# Patient Record
Sex: FEMALE | Race: WHITE | NOT HISPANIC OR LATINO
[De-identification: names, ages, dates, MRNs, and addresses within clinical notes are randomized per-mention and may not be internally consistent; named-entity substitution may affect disease eponyms.]

---

## 2019-02-09 ENCOUNTER — TRANSCRIPTION ENCOUNTER (OUTPATIENT)
Age: 69
End: 2019-02-09

## 2019-03-25 ENCOUNTER — APPOINTMENT (OUTPATIENT)
Dept: OBGYN | Facility: CLINIC | Age: 69
End: 2019-03-25
Payer: MEDICARE

## 2019-03-25 VITALS
HEIGHT: 59 IN | SYSTOLIC BLOOD PRESSURE: 150 MMHG | BODY MASS INDEX: 34.07 KG/M2 | WEIGHT: 169 LBS | HEART RATE: 73 BPM | DIASTOLIC BLOOD PRESSURE: 77 MMHG

## 2019-03-25 DIAGNOSIS — Z80.41 FAMILY HISTORY OF MALIGNANT NEOPLASM OF OVARY: ICD-10-CM

## 2019-03-25 DIAGNOSIS — Z86.39 PERSONAL HISTORY OF OTHER ENDOCRINE, NUTRITIONAL AND METABOLIC DISEASE: ICD-10-CM

## 2019-03-25 DIAGNOSIS — N39.3 STRESS INCONTINENCE (FEMALE) (MALE): ICD-10-CM

## 2019-03-25 DIAGNOSIS — Z01.419 ENCOUNTER FOR GYNECOLOGICAL EXAMINATION (GENERAL) (ROUTINE) W/OUT ABNORMAL FINDINGS: ICD-10-CM

## 2019-03-25 DIAGNOSIS — N95.2 POSTMENOPAUSAL ATROPHIC VAGINITIS: ICD-10-CM

## 2019-03-25 PROCEDURE — 99387 INIT PM E/M NEW PAT 65+ YRS: CPT

## 2019-03-25 NOTE — PHYSICAL EXAM
[Awake] : awake [Alert] : alert [Soft] : soft [Oriented x3] : oriented to person, place, and time [Vulvar Atrophy] : vulvar atrophy [Normal] : vagina [Atrophy] : atrophy [No Bleeding] : there was no active vaginal bleeding [Absent] : absent [Adnexa Absent] : absent bilaterally [Acute Distress] : no acute distress [Mass] : no breast mass [Nipple Discharge] : no nipple discharge [Axillary LAD] : no axillary lymphadenopathy [Tender] : non tender

## 2019-03-27 ENCOUNTER — ASOB RESULT (OUTPATIENT)
Age: 69
End: 2019-03-27

## 2019-03-27 ENCOUNTER — APPOINTMENT (OUTPATIENT)
Dept: OBGYN | Facility: CLINIC | Age: 69
End: 2019-03-27
Payer: MEDICARE

## 2019-03-27 PROCEDURE — 76830 TRANSVAGINAL US NON-OB: CPT

## 2019-04-02 LAB
CYTOLOGY CVX/VAG DOC THIN PREP: NORMAL
HPV HIGH+LOW RISK DNA PNL CVX: NOT DETECTED

## 2019-07-15 ENCOUNTER — APPOINTMENT (OUTPATIENT)
Dept: UROGYNECOLOGY | Facility: CLINIC | Age: 69
End: 2019-07-15
Payer: MEDICARE

## 2019-07-15 VITALS
SYSTOLIC BLOOD PRESSURE: 144 MMHG | HEIGHT: 59 IN | BODY MASS INDEX: 33.26 KG/M2 | WEIGHT: 165 LBS | HEART RATE: 72 BPM | DIASTOLIC BLOOD PRESSURE: 77 MMHG

## 2019-07-15 DIAGNOSIS — Z82.3 FAMILY HISTORY OF STROKE: ICD-10-CM

## 2019-07-15 DIAGNOSIS — Z80.9 FAMILY HISTORY OF MALIGNANT NEOPLASM, UNSPECIFIED: ICD-10-CM

## 2019-07-15 DIAGNOSIS — E07.9 DISORDER OF THYROID, UNSPECIFIED: ICD-10-CM

## 2019-07-15 DIAGNOSIS — H40.9 UNSPECIFIED GLAUCOMA: ICD-10-CM

## 2019-07-15 DIAGNOSIS — R01.1 CARDIAC MURMUR, UNSPECIFIED: ICD-10-CM

## 2019-07-15 DIAGNOSIS — Z83.79 FAMILY HISTORY OF OTHER DISEASES OF THE DIGESTIVE SYSTEM: ICD-10-CM

## 2019-07-15 DIAGNOSIS — Z83.42 FAMILY HISTORY OF FAMILIAL HYPERCHOLESTEROLEMIA: ICD-10-CM

## 2019-07-15 DIAGNOSIS — E78.00 PURE HYPERCHOLESTEROLEMIA, UNSPECIFIED: ICD-10-CM

## 2019-07-15 DIAGNOSIS — Z82.49 FAMILY HISTORY OF ISCHEMIC HEART DISEASE AND OTHER DISEASES OF THE CIRCULATORY SYSTEM: ICD-10-CM

## 2019-07-15 DIAGNOSIS — Z83.49 FAMILY HISTORY OF OTHER ENDOCRINE, NUTRITIONAL AND METABOLIC DISEASES: ICD-10-CM

## 2019-07-15 DIAGNOSIS — K59.00 CONSTIPATION, UNSPECIFIED: ICD-10-CM

## 2019-07-15 DIAGNOSIS — K46.9 UNSPECIFIED ABDOMINAL HERNIA W/OUT OBSTRUCTION OR GANGRENE: ICD-10-CM

## 2019-07-15 LAB
BILIRUB UR QL STRIP: NORMAL
CLARITY UR: NORMAL
COLLECTION METHOD: NORMAL
GLUCOSE UR-MCNC: NORMAL
HCG UR QL: 0.2 EU/DL
HGB UR QL STRIP.AUTO: NORMAL
KETONES UR-MCNC: NORMAL
LEUKOCYTE ESTERASE UR QL STRIP: NORMAL
NITRITE UR QL STRIP: NORMAL
PH UR STRIP: 5
PROT UR STRIP-MCNC: NORMAL
SP GR UR STRIP: 1.02

## 2019-07-15 PROCEDURE — 99204 OFFICE O/P NEW MOD 45 MIN: CPT | Mod: 25

## 2019-07-15 PROCEDURE — 51701 INSERT BLADDER CATHETER: CPT

## 2019-07-15 NOTE — PROCEDURE
[Fluid Management] : fluid management [Bowel Management] : bowel management [FreeTextEntry1] : Sterile straight catheterization was performed to measure a postvoid residual volume which was 20 cc

## 2019-07-15 NOTE — REASON FOR VISIT
[[employer] ] : provided by [employer]   [FreeTextEntry1] : [id number] [FreeTextEntry2] : [place]

## 2019-07-15 NOTE — PHYSICAL EXAM
[No Acute Distress] : in no acute distress [Oriented x3] : oriented to person, place, and time [Normal Memory] : ~T memory was ~L unimpaired [Normal Mood/Affect] : mood and affect are normal [Warm and Dry] : was warm and dry to touch [Normal Gait] : gait was normal [Vulvar Atrophy] : vulvar atrophy [Labia Majora] : were normal [Labia Minora] : were normal [Normal Appearance] : general appearance was normal [Atrophy] : atrophy [No Bleeding] : there was no active vaginal bleeding [Normal] : no abnormalities [Absent] : absent [Tenderness] : ~T no ~M abdominal tenderness observed [Distended] : not distended [H/Smegaly] : no hepatosplenomegaly [Inguinal LAD] : no adenopathy was noted in the inguinal lymph nodes

## 2019-07-15 NOTE — DISCUSSION/SUMMARY
[FreeTextEntry1] : \par Patient presents with symptoms of mixed urinary incontinence with a predominant   component. We discussed possible etiologies of her symptoms including both stress urinary incontinence and overactive bladder. We reviewed management options for both conditions. I recommend further workup of her urinary symptoms with urodynamic testing. We reviewed behavioral modifications and bladder training. Written and verbal instructions were provided to her as well in Gambian. She will return to my office for urodynamics and followup with me to discuss results and management options further. [facility] information on urodynamics was provided to her in Gambian as well.

## 2019-07-15 NOTE — HISTORY OF PRESENT ILLNESS
[Urinary Frequency] : none [Unable To Restrain Bowel Movement] : daily [Feelings Of Urinary Urgency] : daily [x2] : two times a night [Urinary Tract Infection] : daily [Constipation Obstructed Defecation] : rare [] : years ago [de-identified] : does not use pads daily [de-identified] : 1-2 [de-identified] : sexually active [FreeTextEntry1] : \par \par PMH: hypothyroidism\par PSH: total hysterectomy, BSO (benign)\par Social History: , nonsmoker\par \par daily fluid intake: 1-1.5 L water, 32 oz coffee, 32 oz seltzer per week

## 2019-07-16 PROBLEM — K46.9 HERNIA: Status: RESOLVED | Noted: 2019-07-16 | Resolved: 2019-07-16

## 2019-07-16 PROBLEM — E07.9 THYROID DISORDER: Status: ACTIVE | Noted: 2019-07-16

## 2019-07-16 PROBLEM — Z82.49 FAMILY HISTORY OF HYPERTENSION: Status: ACTIVE | Noted: 2019-07-16

## 2019-07-16 PROBLEM — K59.00 CONSTIPATION: Status: ACTIVE | Noted: 2019-07-16

## 2019-07-16 PROBLEM — R01.1 MURMUR: Status: ACTIVE | Noted: 2019-07-16

## 2019-07-16 PROBLEM — Z82.3 FAMILY HISTORY OF STROKE: Status: ACTIVE | Noted: 2019-07-16

## 2019-07-16 PROBLEM — Z83.49 FAMILY HISTORY OF THYROID DISEASE: Status: ACTIVE | Noted: 2019-07-16

## 2019-07-16 PROBLEM — E78.00 HYPERCHOLESTEROLEMIA: Status: ACTIVE | Noted: 2019-07-16

## 2019-07-16 PROBLEM — Z83.42 FAMILY HISTORY OF HYPERCHOLESTEROLEMIA: Status: ACTIVE | Noted: 2019-07-16

## 2019-07-16 PROBLEM — Z80.9 FAMILY HISTORY OF MALIGNANT NEOPLASM: Status: ACTIVE | Noted: 2019-07-16

## 2019-07-16 PROBLEM — Z83.79 FAMILY HISTORY OF LIVER DISEASE: Status: ACTIVE | Noted: 2019-07-16

## 2019-07-16 PROBLEM — H40.9 GLAUCOMA: Status: ACTIVE | Noted: 2019-07-16

## 2019-07-16 LAB
APPEARANCE: CLEAR
BACTERIA: NEGATIVE
BILIRUBIN URINE: NEGATIVE
BLOOD URINE: NEGATIVE
COLOR: NORMAL
GLUCOSE QUALITATIVE U: NEGATIVE
HYALINE CASTS: 4 /LPF
KETONES URINE: NEGATIVE
LEUKOCYTE ESTERASE URINE: NEGATIVE
MICROSCOPIC-UA: NORMAL
NITRITE URINE: NEGATIVE
PH URINE: 6
PROTEIN URINE: NORMAL
RED BLOOD CELLS URINE: 1 /HPF
SPECIFIC GRAVITY URINE: 1.02
SQUAMOUS EPITHELIAL CELLS: 1 /HPF
UROBILINOGEN URINE: NORMAL
WHITE BLOOD CELLS URINE: 0 /HPF

## 2019-07-17 LAB — BACTERIA UR CULT: NORMAL

## 2019-08-15 ENCOUNTER — OUTPATIENT (OUTPATIENT)
Dept: OUTPATIENT SERVICES | Facility: HOSPITAL | Age: 69
LOS: 1 days | End: 2019-08-15
Payer: COMMERCIAL

## 2019-08-15 ENCOUNTER — APPOINTMENT (OUTPATIENT)
Dept: UROGYNECOLOGY | Facility: CLINIC | Age: 69
End: 2019-08-15
Payer: MEDICARE

## 2019-08-15 DIAGNOSIS — R39.15 URGENCY OF URINATION: ICD-10-CM

## 2019-08-15 DIAGNOSIS — Z01.818 ENCOUNTER FOR OTHER PREPROCEDURAL EXAMINATION: ICD-10-CM

## 2019-08-15 DIAGNOSIS — R35.0 FREQUENCY OF MICTURITION: ICD-10-CM

## 2019-08-15 DIAGNOSIS — N39.3 STRESS INCONTINENCE (FEMALE) (MALE): ICD-10-CM

## 2019-08-15 PROCEDURE — 51797 INTRAABDOMINAL PRESSURE TEST: CPT | Mod: 26

## 2019-08-15 PROCEDURE — 51784 ANAL/URINARY MUSCLE STUDY: CPT

## 2019-08-15 PROCEDURE — 51797 INTRAABDOMINAL PRESSURE TEST: CPT

## 2019-08-15 PROCEDURE — 51729 CYSTOMETROGRAM W/VP&UP: CPT

## 2019-08-15 PROCEDURE — 51784 ANAL/URINARY MUSCLE STUDY: CPT | Mod: 26

## 2019-08-15 PROCEDURE — 51729 CYSTOMETROGRAM W/VP&UP: CPT | Mod: 26

## 2019-08-26 ENCOUNTER — APPOINTMENT (OUTPATIENT)
Dept: UROGYNECOLOGY | Facility: CLINIC | Age: 69
End: 2019-08-26
Payer: MEDICARE

## 2019-08-26 DIAGNOSIS — N39.41 URGE INCONTINENCE: ICD-10-CM

## 2019-08-26 DIAGNOSIS — N39.3 STRESS INCONTINENCE (FEMALE) (MALE): ICD-10-CM

## 2019-08-26 DIAGNOSIS — N32.81 OVERACTIVE BLADDER: ICD-10-CM

## 2019-08-26 PROCEDURE — 99214 OFFICE O/P EST MOD 30 MIN: CPT

## 2019-08-26 NOTE — HISTORY OF PRESENT ILLNESS
[Feelings Of Urinary Urgency] : daily [Urinary Frequency] : none [Urinary Tract Infection] : daily [Constipation Obstructed Defecation] : rare [] : years ago [Unable To Restrain Bowel Movement] : frequent [x1] : once nightly [de-identified] : not as bothersome as her UUI [de-identified] : improved but still bothersome [de-identified] : sexually active [FreeTextEntry1] : \par Zuleima presents for follow up and discussion of urodynamic results. She underwent urodynamics which revealed LESLIE. We discussed that although her test was negative for detrusor overactivity, she may still have an overactive bladder. She continues to report more bothersome UUI and OAB symptoms compared to LESLIE. She reports some improvement with fliuid modifications however still bothersome. \par \par PMH: hypothyroidism, glaucoma \par PSH: total hysterectomy, BSO (benign)\par Social History: , nonsmoker\par \par

## 2019-08-26 NOTE — DISCUSSION/SUMMARY
[FreeTextEntry1] : \par 1. LESLIE: Computer generated images were used to demonstrate stress urinary incontinence and treatment options. We reviewed management options for stress urinary incontinence including: observation, pelvic floor exercises, continence devices, periurethral bulking agents, imipramine, and surgical management. We discussed surgical management options including a midurethral sling, hartman colposuspension and pubovaginal sling using native tissue. Written information on stress urinary incontinence including management options from [facility] was provided to her in Guamanian and English and reviewed. We reviewed risks and benefits of each procedure.  Will read over information and speak with her family. WIll discuss again at her next visit. \par \par 2. Overactive bladder, UUI: Continue behavioral and fluid modifications. Will start a trial of Myrbetriq 50 mg daily. She is unable to take anticholinergics due to h/o glaucoma. IUGA information on OAB provided to her in English and Guamanian. \par \par RTO in 2 months for follow up, or sooner if issues arise. All questions answered.

## 2019-08-26 NOTE — PHYSICAL EXAM
[No Acute Distress] : in no acute distress [Normal Memory] : ~T memory was ~L unimpaired [Oriented x3] : oriented to person, place, and time [Normal Mood/Affect] : mood and affect are normal [Warm and Dry] : was warm and dry to touch [Normal Gait] : gait was normal [Vulvar Atrophy] : vulvar atrophy [Labia Majora] : were normal [Labia Minora] : were normal [Normal Appearance] : general appearance was normal [Atrophy] : atrophy [No Bleeding] : there was no active vaginal bleeding [Normal] : no abnormalities [Absent] : absent [Tenderness] : ~T no ~M abdominal tenderness observed [Distended] : not distended [H/Smegaly] : no hepatosplenomegaly [Inguinal LAD] : no adenopathy was noted in the inguinal lymph nodes

## 2022-01-06 ENCOUNTER — APPOINTMENT (OUTPATIENT)
Dept: ENDOCRINOLOGY | Facility: CLINIC | Age: 72
End: 2022-01-06
Payer: MEDICARE

## 2022-01-06 PROCEDURE — 99204 OFFICE O/P NEW MOD 45 MIN: CPT | Mod: 95

## 2022-01-06 NOTE — ASSESSMENT
[FreeTextEntry1] : 1. Hypothyroidism\par - check thyroid panel, TPO/Tg antibodies\par - continue Synthroid 200 mcg 6/week, pending results\par - Proper intake of levothyroxine is reviewed in details, including on an empty stomach, with water only, at least one hour before taking any medications, vitamins, or supplements and three-four hours before taking iron or calcium.\par - thyroid US\par \par 2. Screening for osteoporosis\par - calcium 500 mg bid, add extra OTC vitamin D3 2,000 IU/day\par - continue weight-bearing exercises; advised avoiding high risk sports\par - DXA 3 sites\par \par RTC post labs/imaging for an in-person visit.\par \par

## 2022-01-06 NOTE — HISTORY OF PRESENT ILLNESS
[Home] : at home, [unfilled] , at the time of the visit. [Medical Office: ([facility])___] : at the medical office located in  [Verbal consent obtained from patient] : the patient, [unfilled] [Family Member] : family member [FreeTextEntry1] : *** Initial Televisit  Jan 06, 2022  (scheduled TELE because of the COVID exposure) ***\par \par \par 71 year female  referred for hypothyroidism management. \par Ms. HARTMANN  was diagnosed with hypothyroidism more than 20 years ago. She was previously under care of Dr. Lucas.\par Most recently on Synthroid brand 200 mcg 6 days a week.\par She's been feeling more tired recently, with some neck discomfort.\par Denies family history of thyroid cancer or history of radiation exposure to head and neck area in a childhood. Her sister and a niece are presumably treated for an underactive thyroid. \par She recalls her last DXA done more than 2 years ago,  and did not have a thyroid US done yet

## 2022-01-14 ENCOUNTER — APPOINTMENT (OUTPATIENT)
Dept: RADIOLOGY | Facility: CLINIC | Age: 72
End: 2022-01-14
Payer: MEDICARE

## 2022-01-14 ENCOUNTER — APPOINTMENT (OUTPATIENT)
Dept: ULTRASOUND IMAGING | Facility: CLINIC | Age: 72
End: 2022-01-14
Payer: MEDICARE

## 2022-01-14 PROCEDURE — 76536 US EXAM OF HEAD AND NECK: CPT

## 2022-01-14 PROCEDURE — 77080 DXA BONE DENSITY AXIAL: CPT

## 2022-01-28 ENCOUNTER — APPOINTMENT (OUTPATIENT)
Dept: ENDOCRINOLOGY | Facility: CLINIC | Age: 72
End: 2022-01-28
Payer: MEDICARE

## 2022-01-28 VITALS
OXYGEN SATURATION: 99 % | RESPIRATION RATE: 16 BRPM | SYSTOLIC BLOOD PRESSURE: 162 MMHG | DIASTOLIC BLOOD PRESSURE: 77 MMHG | WEIGHT: 169 LBS | BODY MASS INDEX: 34.07 KG/M2 | HEART RATE: 69 BPM | HEIGHT: 59 IN

## 2022-01-28 PROCEDURE — 99214 OFFICE O/P EST MOD 30 MIN: CPT

## 2022-01-28 NOTE — ASSESSMENT
[FreeTextEntry1] : 1. Hypothyroidism\par - continue Synthroid 200 mcg 6/week\par - Proper intake of levothyroxine is reviewed in details, including on an empty stomach, with water only, at least one hour before taking any medications, vitamins, or supplements and three-four hours before taking iron or calcium.\par \par 2. Negative screening for osteoporosis\par - calcium 500 mg bid, add extra OTC vitamin D3 2,000 IU/day\par - continue weight-bearing exercises; advised avoiding high risk sports\par - DXA 3 sites in 1/24\par \par RTC 6 mos, or sooner prn\par \par

## 2022-01-28 NOTE — HISTORY OF PRESENT ILLNESS
[FreeTextEntry1] : F/u for hypothyroidism management\par \par *** Jan 28, 2022 ***\par \par Labs from  1/14/22- TSH- 0.68, a1c- 5.8%\par \par Thyroid US (1/14/22)- markedly atrophic gland. no nodules\par DXA (1/14/22)- FN (+0.8), LS (+1.6), radius 1/3 (-0.7)\par \par \par *** Initial Televisit  Jan 06, 2022  (scheduled TELE because of the COVID exposure) ***\par \par \par 71 year female  referred for hypothyroidism management. \par Ms. HARTMANN  was diagnosed with hypothyroidism more than 20 years ago. She was previously under care of Dr. Lucas.\par Most recently on Synthroid brand 200 mcg 6 days a week.\par She's been feeling more tired recently, with some neck discomfort.\par Denies family history of thyroid cancer or history of radiation exposure to head and neck area in a childhood. Her sister and a niece are presumably treated for an underactive thyroid. \par She recalls her last DXA done more than 2 years ago,  and did not have a thyroid US done yet

## 2022-02-03 ENCOUNTER — APPOINTMENT (OUTPATIENT)
Dept: OTOLARYNGOLOGY | Facility: CLINIC | Age: 72
End: 2022-02-03
Payer: MEDICARE

## 2022-02-03 VITALS
DIASTOLIC BLOOD PRESSURE: 91 MMHG | TEMPERATURE: 97.9 F | SYSTOLIC BLOOD PRESSURE: 173 MMHG | WEIGHT: 169 LBS | HEART RATE: 98 BPM | HEIGHT: 59 IN | BODY MASS INDEX: 34.07 KG/M2

## 2022-02-03 DIAGNOSIS — R09.81 NASAL CONGESTION: ICD-10-CM

## 2022-02-03 DIAGNOSIS — J32.0 CHRONIC MAXILLARY SINUSITIS: ICD-10-CM

## 2022-02-03 PROCEDURE — 99204 OFFICE O/P NEW MOD 45 MIN: CPT | Mod: 25

## 2022-02-03 PROCEDURE — 31231 NASAL ENDOSCOPY DX: CPT

## 2022-02-03 NOTE — ASSESSMENT
[FreeTextEntry1] : Patient 71-year-old female her long history of recurrent sinus symptoms treated with several courses of antibiotics a year here for evaluation hearing is otherwise fine nasal endoscopy shows a deviated septum but no tumors masses or polyps I sent her for a CAT scan to definitively evaluate her sinuses.  Patient has been on Flonase I told her to stop because she gives a history of having glaucoma instead I switched him to azelastine.

## 2022-02-03 NOTE — HISTORY OF PRESENT ILLNESS
[de-identified] : Patient gets about 3-4 sinus infections every year and requires antibiotics to get through them. The last antibiotic was a month ago. When she has an infection she has pressure in the cheeks bilaterally and thick mucus. She has never had a CT of the sinuses before. RIght now she has mild congestion in the nose. She uses Flonase nasal spray daily as prescribed by her PCP.

## 2022-02-03 NOTE — REVIEW OF SYSTEMS
[Nasal Congestion] : nasal congestion [Recurrent Sinus Infections] : recurrent sinus infections [Sinus Pressure] : sinus pressure [Negative] : Heme/Lymph

## 2022-02-03 NOTE — CONSULT LETTER
[Dear  ___] : Dear  [unfilled], [Consult Letter:] : I had the pleasure of evaluating your patient, [unfilled]. [Please see my note below.] : Please see my note below. [Consult Closing:] : Thank you very much for allowing me to participate in the care of this patient.  If you have any questions, please do not hesitate to contact me. [Sincerely,] : Sincerely, [FreeTextEntry3] : Karlos Salazar MD\par [facility]\par Otolaryngology and Facial Plastics\par Associated Professor, [employer]\par

## 2022-02-03 NOTE — END OF VISIT
[FreeTextEntry3] : I saw and examined this patient in person. I have discussed with Britni Wells, Physician Assistant, in detail the above note and agree with the above assessment and plan of care.\par

## 2022-02-10 ENCOUNTER — APPOINTMENT (OUTPATIENT)
Dept: CT IMAGING | Facility: CLINIC | Age: 72
End: 2022-02-10
Payer: MEDICARE

## 2022-02-10 PROCEDURE — 70486 CT MAXILLOFACIAL W/O DYE: CPT

## 2022-02-14 ENCOUNTER — NON-APPOINTMENT (OUTPATIENT)
Age: 72
End: 2022-02-14

## 2022-07-14 ENCOUNTER — APPOINTMENT (OUTPATIENT)
Dept: ENDOCRINOLOGY | Facility: CLINIC | Age: 72
End: 2022-07-14

## 2022-07-14 VITALS
BODY MASS INDEX: 34.07 KG/M2 | OXYGEN SATURATION: 99 % | TEMPERATURE: 97.2 F | SYSTOLIC BLOOD PRESSURE: 140 MMHG | WEIGHT: 169 LBS | HEART RATE: 72 BPM | DIASTOLIC BLOOD PRESSURE: 80 MMHG | HEIGHT: 59 IN

## 2022-07-14 PROCEDURE — 36415 COLL VENOUS BLD VENIPUNCTURE: CPT

## 2022-07-14 PROCEDURE — 99213 OFFICE O/P EST LOW 20 MIN: CPT | Mod: 25

## 2022-07-14 NOTE — ASSESSMENT
[FreeTextEntry1] : 1. Hypothyroidism\par - continue Synthroid 200 mcg 6/week, pending results\par - repeat thyroid functions today and readjust the dose if needed. Possibly will switch to 175 mg qd\par - Proper intake of levothyroxine is reviewed in details, including on an empty stomach, with water only, at least one hour before taking any medications, vitamins, or supplements and three-four hours before taking iron or calcium.\par \par 2. Negative screening for osteoporosis\par - calcium 500 mg bid, add extra OTC vitamin D3 2,000 IU/day\par - continue weight-bearing exercises; advised avoiding high risk sports\par - DXA 3 sites in 1/24\par \par RTC 3-6 mos, depending on the labs\par \par

## 2022-07-19 LAB
T4 FREE SERPL-MCNC: 1.4 NG/DL
TSH SERPL-ACNC: 4.3 UIU/ML

## 2022-07-28 ENCOUNTER — APPOINTMENT (OUTPATIENT)
Dept: RADIOLOGY | Facility: CLINIC | Age: 72
End: 2022-07-28

## 2022-07-28 PROCEDURE — 73060 X-RAY EXAM OF HUMERUS: CPT | Mod: RT

## 2022-07-28 PROCEDURE — 73030 X-RAY EXAM OF SHOULDER: CPT | Mod: RT

## 2022-08-18 ENCOUNTER — RX RENEWAL (OUTPATIENT)
Age: 72
End: 2022-08-18

## 2022-08-18 ENCOUNTER — APPOINTMENT (OUTPATIENT)
Dept: ORTHOPEDIC SURGERY | Facility: CLINIC | Age: 72
End: 2022-08-18

## 2022-08-18 VITALS — WEIGHT: 169 LBS | HEIGHT: 60 IN | BODY MASS INDEX: 33.18 KG/M2

## 2022-08-18 DIAGNOSIS — Z00.00 ENCOUNTER FOR GENERAL ADULT MEDICAL EXAMINATION W/OUT ABNORMAL FINDINGS: ICD-10-CM

## 2022-08-18 PROCEDURE — 73564 X-RAY EXAM KNEE 4 OR MORE: CPT | Mod: LT

## 2022-08-18 PROCEDURE — 99204 OFFICE O/P NEW MOD 45 MIN: CPT

## 2022-08-18 NOTE — DISCUSSION/SUMMARY
[de-identified] : This patient has left knee osteoarthritis.  The patient is not an appropriate candidate for surgical intervention at this time. An extensive discussion was conducted on the natural history of the disease and the variety of surgical and non-surgical options available to the patient including, but not limited to non-steroidal anti-inflammatory medications, steroid injections, physical therapy, maintenance of ideal body weight, and reduction of activity.  I recommended and prescribed a course of Mobic and physical therapy.  The patient is also encouraged to trial a neoprene sleeve knee brace which can be purchased OTC.  The patient is also encouraged to consider use of a cane. The patient will schedule an appointment as needed.\par  No

## 2022-08-18 NOTE — HISTORY OF PRESENT ILLNESS
[de-identified] : This is very nice 71-year-old female experiencing left knee pain, which is severe in intensity. The pain substantially limits activities of daily living. Walking tolerance is reduced.  She has a history of a left knee arthroscopy.  History of hyaluronic acid injections with another doctor.  Cortisone junctions have worked well.  She takes Aleve which helps.  Has not tried physical therapy.  Not interested in surgical intervention at this time.  The patient denies any radiation of the pain to the feet and it is not associated with numbness, tingling, or weakness.

## 2022-10-28 ENCOUNTER — APPOINTMENT (OUTPATIENT)
Dept: MAMMOGRAPHY | Facility: CLINIC | Age: 72
End: 2022-10-28

## 2022-10-28 PROCEDURE — 77063 BREAST TOMOSYNTHESIS BI: CPT

## 2022-10-28 PROCEDURE — 77067 SCR MAMMO BI INCL CAD: CPT

## 2022-11-13 ENCOUNTER — RX RENEWAL (OUTPATIENT)
Age: 72
End: 2022-11-13

## 2022-11-17 ENCOUNTER — APPOINTMENT (OUTPATIENT)
Dept: ORTHOPEDIC SURGERY | Facility: CLINIC | Age: 72
End: 2022-11-17

## 2022-11-17 VITALS
HEART RATE: 89 BPM | WEIGHT: 168 LBS | BODY MASS INDEX: 32.98 KG/M2 | HEIGHT: 60 IN | TEMPERATURE: 98 F | OXYGEN SATURATION: 98 %

## 2022-11-17 PROCEDURE — 99215 OFFICE O/P EST HI 40 MIN: CPT

## 2022-11-17 NOTE — HISTORY OF PRESENT ILLNESS
[de-identified] : This is very nice 71-year-old female experiencing left knee pain, which is severe in intensity.  She has known left knee osteoarthritis.  The pain substantially limits activities of daily living. Walking tolerance is reduced.  She has a history of a left knee arthroscopy.  History of hyaluronic acid injections with another doctor.  Cortisone injections have worked well.  She takes Aleve which helps.  Has not tried physical therapy.  The patient denies any radiation of the pain to the feet and it is not associated with numbness, tingling, or weakness.

## 2022-11-17 NOTE — PHYSICAL EXAM
[de-identified] : Patient is well nourished, well-developed, in no acute distress, with appropriate mood and affect. The patient is oriented to time, place, and person. Respirations are even and unlabored. Gait evaluation does reveal a limp. There is no inguinal adenopathy. Examination of the contralateral knee shows normal range of motion, strength, no tenderness, and intact skin. The affected limb is well-perfused, without skin lesions, shows a grossly normal motor and sensory examination. Knee motion is significantly reduced and does cause significant pain. The knee moves from 0 to 115 degrees. The knee is stable within that range-of-motion to AP and ML stress. The alignment of the knee is 5 degrees varus. Muscle strength is normal. Pedal pulses are palpable. Hip examination was negative.\par  [de-identified] : Long standing knee, AP knee, lateral knee, and patellar views of the left knee were brought in by the patient which I reviewed and demonstrate severe degenerative joint disease of the knee with joint space narrowing, osteophyte formation, and subchondral sclerosis.

## 2022-11-17 NOTE — REASON FOR VISIT
[Follow-Up Visit] : a follow-up visit for [Knee Pain] : knee pain [Osteoarthritis, Knee] : osteoarthritis of the knee

## 2022-11-17 NOTE — DISCUSSION/SUMMARY
[de-identified] : This patient has left knee osteoarthritis.  She has tried and failed a course of conservative management and would like to proceed with left total knee arthroplasty using robotic navigation for assistance.\par \par The patient is an appropriate candidate for consideration of left total knee replacement. An extensive discussion was conducted of the natural history of the disease and the variety of surgical and non-surgical treatment options available to the patient. A risk/benefit analysis was discussed with the patient reviewing the advantages and disadvantages of surgical intervention at this time. Both the level and length of the patient's pain have made additional conservative treatment measures consisting of NSAIDs, physical therapy, corticosteroids, and/or viscosupplementation contraindicated. A full explanation was given of the nature and the purpose of the procedure and anesthesia, its benefits, possible alternative methods of diagnosis or treatment, the risks involved, the possibility of complications, the foreseeable consequences of the procedure and the possible results of the non-treatment. I reviewed the plan of care as well as used a model of a total knee implant equivalent to the one that will be used for their total knee joint replacement. The ability to secure the implant utilizing cement or cementless (press-fit) was discussed with the patient. The patient agrees with the plan of care, as well as the use of implants for their total knee replacement.   We also discussed that if robotic/computer navigation is utilized, then additional incisions may need to be made to accommodate the computer navigation arrays, which will be placed in the femur and tibia.\par \par No guarantee or assurance was made as to the results that may be obtained. Specifically, the risks were identified to include, but are not limited to the following: Infection, phlebitis, pulmonary embolism, death, paralysis, dislocation, pain, stiffness, instability, limp, weakness, breakage, leg-length inequality, uncontrolled bleeding, nerve injury, blood vessel injury, pressure sores, anesthetic risks, delayed healing of wound and bone, and wear and loosening. Further discussion was undertaken with the patient about the details of surgical preparation, treatment, and postoperative rehabilitation including medical clearance, autotransfusion, the hospital course, and the postoperative rehabilitation involved. All in all, I feel that this patient is a good candidate for surgical reconstruction.\par \par The patient and I discussed the current SARS-CoV-2 (COVID-19) pandemic which has affected our local hospitals. We discussed that our hospitals treat patients with COVID-19. All efforts will be made to avoid cohorting the patient with diagnosed or suspected COVID-19 patient. They also understand that we will screen them 24-48 hours prior to surgery. Despite our best efforts, there is a potential risk for iatrogenic transmission of COVID-19 to the patient during the perioperative period. Rick COVID-19 during the perioperative period may increase the patient´s risks of an adverse outcome including postoperative pneumonia, difficulty breathing, requirement for a breathing tube (general endotracheal intubation), and death. The patient is understanding of this risk, and is willing to proceed with surgery at this time.

## 2023-02-02 ENCOUNTER — APPOINTMENT (OUTPATIENT)
Dept: CT IMAGING | Facility: CLINIC | Age: 73
End: 2023-02-02
Payer: MEDICARE

## 2023-02-02 PROCEDURE — 73700 CT LOWER EXTREMITY W/O DYE: CPT | Mod: LT

## 2023-02-03 ENCOUNTER — NON-APPOINTMENT (OUTPATIENT)
Age: 73
End: 2023-02-03

## 2023-02-17 ENCOUNTER — OUTPATIENT (OUTPATIENT)
Dept: OUTPATIENT SERVICES | Facility: HOSPITAL | Age: 73
LOS: 1 days | End: 2023-02-17
Payer: MEDICARE

## 2023-02-17 VITALS
HEIGHT: 60 IN | WEIGHT: 166.01 LBS | OXYGEN SATURATION: 97 % | RESPIRATION RATE: 17 BRPM | HEART RATE: 101 BPM | DIASTOLIC BLOOD PRESSURE: 103 MMHG | SYSTOLIC BLOOD PRESSURE: 166 MMHG | TEMPERATURE: 99 F

## 2023-02-17 DIAGNOSIS — Z98.890 OTHER SPECIFIED POSTPROCEDURAL STATES: Chronic | ICD-10-CM

## 2023-02-17 DIAGNOSIS — Z90.49 ACQUIRED ABSENCE OF OTHER SPECIFIED PARTS OF DIGESTIVE TRACT: Chronic | ICD-10-CM

## 2023-02-17 DIAGNOSIS — Z29.9 ENCOUNTER FOR PROPHYLACTIC MEASURES, UNSPECIFIED: ICD-10-CM

## 2023-02-17 DIAGNOSIS — M17.12 UNILATERAL PRIMARY OSTEOARTHRITIS, LEFT KNEE: ICD-10-CM

## 2023-02-17 DIAGNOSIS — Z01.818 ENCOUNTER FOR OTHER PREPROCEDURAL EXAMINATION: ICD-10-CM

## 2023-02-17 LAB
A1C WITH ESTIMATED AVERAGE GLUCOSE RESULT: 5.5 % — SIGNIFICANT CHANGE UP (ref 4–5.6)
ANION GAP SERPL CALC-SCNC: 16 MMOL/L — SIGNIFICANT CHANGE UP (ref 5–17)
BLD GP AB SCN SERPL QL: NEGATIVE — SIGNIFICANT CHANGE UP
BUN SERPL-MCNC: 25 MG/DL — HIGH (ref 7–23)
CALCIUM SERPL-MCNC: 9.5 MG/DL — SIGNIFICANT CHANGE UP (ref 8.4–10.5)
CHLORIDE SERPL-SCNC: 106 MMOL/L — SIGNIFICANT CHANGE UP (ref 96–108)
CO2 SERPL-SCNC: 19 MMOL/L — LOW (ref 22–31)
CREAT SERPL-MCNC: 0.61 MG/DL — SIGNIFICANT CHANGE UP (ref 0.5–1.3)
EGFR: 94 ML/MIN/1.73M2 — SIGNIFICANT CHANGE UP
ESTIMATED AVERAGE GLUCOSE: 111 MG/DL — SIGNIFICANT CHANGE UP (ref 68–114)
GLUCOSE SERPL-MCNC: 141 MG/DL — HIGH (ref 70–99)
HCT VFR BLD CALC: 40.8 % — SIGNIFICANT CHANGE UP (ref 34.5–45)
HGB BLD-MCNC: 13.2 G/DL — SIGNIFICANT CHANGE UP (ref 11.5–15.5)
MCHC RBC-ENTMCNC: 28.1 PG — SIGNIFICANT CHANGE UP (ref 27–34)
MCHC RBC-ENTMCNC: 32.4 GM/DL — SIGNIFICANT CHANGE UP (ref 32–36)
MCV RBC AUTO: 86.8 FL — SIGNIFICANT CHANGE UP (ref 80–100)
MRSA PCR RESULT.: SIGNIFICANT CHANGE UP
NRBC # BLD: 0 /100 WBCS — SIGNIFICANT CHANGE UP (ref 0–0)
PLATELET # BLD AUTO: 309 K/UL — SIGNIFICANT CHANGE UP (ref 150–400)
POTASSIUM SERPL-MCNC: 3.8 MMOL/L — SIGNIFICANT CHANGE UP (ref 3.5–5.3)
POTASSIUM SERPL-SCNC: 3.8 MMOL/L — SIGNIFICANT CHANGE UP (ref 3.5–5.3)
RBC # BLD: 4.7 M/UL — SIGNIFICANT CHANGE UP (ref 3.8–5.2)
RBC # FLD: 13.1 % — SIGNIFICANT CHANGE UP (ref 10.3–14.5)
RH IG SCN BLD-IMP: POSITIVE — SIGNIFICANT CHANGE UP
S AUREUS DNA NOSE QL NAA+PROBE: SIGNIFICANT CHANGE UP
SODIUM SERPL-SCNC: 141 MMOL/L — SIGNIFICANT CHANGE UP (ref 135–145)
WBC # BLD: 5.68 K/UL — SIGNIFICANT CHANGE UP (ref 3.8–10.5)
WBC # FLD AUTO: 5.68 K/UL — SIGNIFICANT CHANGE UP (ref 3.8–10.5)

## 2023-02-17 PROCEDURE — 87641 MR-STAPH DNA AMP PROBE: CPT

## 2023-02-17 PROCEDURE — G0463: CPT

## 2023-02-17 PROCEDURE — 85027 COMPLETE CBC AUTOMATED: CPT

## 2023-02-17 PROCEDURE — 36415 COLL VENOUS BLD VENIPUNCTURE: CPT

## 2023-02-17 PROCEDURE — 86901 BLOOD TYPING SEROLOGIC RH(D): CPT

## 2023-02-17 PROCEDURE — 86850 RBC ANTIBODY SCREEN: CPT

## 2023-02-17 PROCEDURE — 80048 BASIC METABOLIC PNL TOTAL CA: CPT

## 2023-02-17 PROCEDURE — 87640 STAPH A DNA AMP PROBE: CPT

## 2023-02-17 PROCEDURE — 83036 HEMOGLOBIN GLYCOSYLATED A1C: CPT

## 2023-02-17 PROCEDURE — 86900 BLOOD TYPING SEROLOGIC ABO: CPT

## 2023-02-17 RX ORDER — GENTAMICIN SULFATE 40 MG/ML
80 VIAL (ML) INJECTION ONCE
Refills: 0 | Status: DISCONTINUED | OUTPATIENT
Start: 2023-03-01 | End: 2023-03-01

## 2023-02-17 RX ORDER — PANTOPRAZOLE SODIUM 20 MG/1
40 TABLET, DELAYED RELEASE ORAL ONCE
Refills: 0 | Status: COMPLETED | OUTPATIENT
Start: 2023-03-01 | End: 2023-03-01

## 2023-02-17 RX ORDER — TRAMADOL HYDROCHLORIDE 50 MG/1
50 TABLET ORAL ONCE
Refills: 0 | Status: DISCONTINUED | OUTPATIENT
Start: 2023-03-01 | End: 2023-03-01

## 2023-02-17 RX ORDER — CHLORHEXIDINE GLUCONATE 213 G/1000ML
1 SOLUTION TOPICAL ONCE
Refills: 0 | Status: DISCONTINUED | OUTPATIENT
Start: 2023-03-01 | End: 2023-03-01

## 2023-02-17 RX ORDER — LIDOCAINE HCL 20 MG/ML
0.2 VIAL (ML) INJECTION ONCE
Refills: 0 | Status: DISCONTINUED | OUTPATIENT
Start: 2023-03-01 | End: 2023-03-01

## 2023-02-17 RX ORDER — VANCOMYCIN HCL 1 G
1000 VIAL (EA) INTRAVENOUS ONCE
Refills: 0 | Status: COMPLETED | OUTPATIENT
Start: 2023-03-01 | End: 2023-03-01

## 2023-02-17 RX ORDER — SODIUM CHLORIDE 9 MG/ML
3 INJECTION INTRAMUSCULAR; INTRAVENOUS; SUBCUTANEOUS EVERY 8 HOURS
Refills: 0 | Status: DISCONTINUED | OUTPATIENT
Start: 2023-03-01 | End: 2023-03-01

## 2023-02-17 NOTE — H&P PST ADULT - PROBLEM SELECTOR PLAN 1
LEFT TOTAL KNEE ARTHROPLASTY WITH HOME ROBOT  Pre-op education provided - all questions answered   Chlorhex soap & instructions given  CBC, BMP, Ha1c, T&S, MRSA/MSSA swab sent in PST

## 2023-02-17 NOTE — H&P PST ADULT - NSICDXPASTSURGICALHX_GEN_ALL_CORE_FT
PAST SURGICAL HISTORY:  H/O colonoscopy     H/O endoscopy     H/O ovarian cystectomy     History of arthroscopy of right shoulder     History of cholecystectomy     S/P left knee arthroscopy

## 2023-02-17 NOTE — H&P PST ADULT - HISTORY OF PRESENT ILLNESS
***Covid test scheduled for 2/26/2023 at [facility].  73 YO F PMH HTN, hypothyroid, GERD, osteoarthritis s/p left knee arthroscopy X2, presents to PST for LEFT TOTAL KNEE ARTHROPLASTY WITH HOME ROBOT 3/1/2023. Denies any palpitations, SOB, N/V, Covid symptoms (fever, chills, cough) or exposure.     ***Covid test scheduled for 2/26/2023 at [facility].

## 2023-02-17 NOTE — H&P PST ADULT - NSICDXPASTMEDICALHX_GEN_ALL_CORE_FT
PAST MEDICAL HISTORY:  2019 novel coronavirus disease (COVID-19)     Acid reflux     HTN (hypertension)     Hypothyroid     Osteoarthritis

## 2023-02-17 NOTE — H&P PST ADULT - ASSESSMENT
DASI score: 5.72 mets  DASI activity: ADLs, walking, stairs, limited d/t left knee pain  Loose teeth or denture: denies CAPRINI VTE 2.0 SCORE [CLOT updated 2019]    AGE RELATED RISK FACTORS                                                       MOBILITY RELATED FACTORS  [ ] Age 41-60 years                                            (1 Point)                    [ ] Bed rest                                                        (1 Point)  [ ] Age: 61-74 years                                           (2 Points)                  [ ] Plaster cast                                                   (2 Points)  [ ] Age= 75 years                                              (3 Points)                    [ ] Bed bound for more than 72 hours                 (2 Points)    DISEASE RELATED RISK FACTORS                                               GENDER SPECIFIC FACTORS  [ ] Edema in the lower extremities                       (1 Point)              [ ] Pregnancy                                                     (1 Point)  [ ] Varicose veins                                               (1 Point)                     [ ] Post-partum < 6 weeks                                   (1 Point)             [ ] BMI > 25 Kg/m2                                            (1 Point)                     [ ] Hormonal therapy  or oral contraception          (1 Point)                 [ ] Sepsis (in the previous month)                        (1 Point)               [ ] History of pregnancy complications                 (1 point)  [ ] Pneumonia or serious lung disease                                               [ ] Unexplained or recurrent                     (1 Point)           (in the previous month)                               (1 Point)  [ ] Abnormal pulmonary function test                     (1 Point)                 SURGERY RELATED RISK FACTORS  [ ] Acute myocardial infarction                              (1 Point)               [ ]  Section                                             (1 Point)  [ ] Congestive heart failure (in the previous month)  (1 Point)      [ ] Minor surgery                                                  (1 Point)   [ ] Inflammatory bowel disease                             (1 Point)               [ ] Arthroscopic surgery                                        (2 Points)  [ ] Central venous access                                      (2 Points)                [ ] General surgery lasting more than 45 minutes (2 points)  [ ] Malignancy- Present or previous                   (2 Points)                [ ] Elective arthroplasty                                         (5 points)    [ ] Stroke (in the previous month)                          (5 Points)                                                                                                                                                           HEMATOLOGY RELATED FACTORS                                                 TRAUMA RELATED RISK FACTORS  [ ] Prior episodes of VTE                                     (3 Points)                [ ] Fracture of the hip, pelvis, or leg                       (5 Points)  [ ] Positive family history for VTE                         (3 Points)             [ ] Acute spinal cord injury (in the previous month)  (5 Points)  [ ] Prothrombin 96856 A                                     (3 Points)               [ ] Paralysis  (less than 1 month)                             (5 Points)  [ ] Factor V Leiden                                             (3 Points)                  [ ] Multiple Trauma within 1 month                        (5 Points)  [ ] Lupus anticoagulants                                     (3 Points)                                                           [ ] Anticardiolipin antibodies                               (3 Points)                                                       [ ] High homocysteine in the blood                      (3 Points)                                             [ ] Other congenital or acquired thrombophilia      (3 Points)                                                [ ] Heparin induced thrombocytopenia                  (3 Points)                                     Total Score [          ] DASI score: 5.72 mets  DASI activity: ADLs, walking, stairs, limited d/t left knee pain  Loose teeth or denture: kyleejudy     CAPRINI VTE 2.0 SCORE [CLOT updated 2019]    AGE RELATED RISK FACTORS                                                       MOBILITY RELATED FACTORS  [ ] Age 41-60 years                                            (1 Point)                    [ ] Bed rest                                                        (1 Point)  [2 ] Age: 61-74 years                                           (2 Points)                  [ ] Plaster cast                                                   (2 Points)  [ ] Age= 75 years                                              (3 Points)                    [ ] Bed bound for more than 72 hours                 (2 Points)    DISEASE RELATED RISK FACTORS                                               GENDER SPECIFIC FACTORS  [ ] Edema in the lower extremities                       (1 Point)              [ ] Pregnancy                                                     (1 Point)  [ ] Varicose veins                                               (1 Point)                     [ ] Post-partum < 6 weeks                                   (1 Point)             [1 ] BMI > 25 Kg/m2                                            (1 Point)                     [ ] Hormonal therapy  or oral contraception          (1 Point)                 [ ] Sepsis (in the previous month)                        (1 Point)               [ ] History of pregnancy complications                 (1 point)  [ ] Pneumonia or serious lung disease                                               [ ] Unexplained or recurrent                     (1 Point)           (in the previous month)                               (1 Point)  [ ] Abnormal pulmonary function test                     (1 Point)                 SURGERY RELATED RISK FACTORS  [ ] Acute myocardial infarction                              (1 Point)               [ ]  Section                                             (1 Point)  [ ] Congestive heart failure (in the previous month)  (1 Point)      [ ] Minor surgery                                                  (1 Point)   [ ] Inflammatory bowel disease                             (1 Point)               [ ] Arthroscopic surgery                                        (2 Points)  [ ] Central venous access                                      (2 Points)                [ ] General surgery lasting more than 45 minutes (2 points)  [ ] Malignancy- Present or previous                   (2 Points)                [5 ] Elective arthroplasty                                         (5 points)    [ ] Stroke (in the previous month)                          (5 Points)                                                                                                                                                           HEMATOLOGY RELATED FACTORS                                                 TRAUMA RELATED RISK FACTORS  [ ] Prior episodes of VTE                                     (3 Points)                [ ] Fracture of the hip, pelvis, or leg                       (5 Points)  [ ] Positive family history for VTE                         (3 Points)             [ ] Acute spinal cord injury (in the previous month)  (5 Points)  [ ] Prothrombin 00781 A                                     (3 Points)               [ ] Paralysis  (less than 1 month)                             (5 Points)  [ ] Factor V Leiden                                             (3 Points)                  [ ] Multiple Trauma within 1 month                        (5 Points)  [ ] Lupus anticoagulants                                     (3 Points)                                                           [ ] Anticardiolipin antibodies                               (3 Points)                                                       [ ] High homocysteine in the blood                      (3 Points)                                             [ ] Other congenital or acquired thrombophilia      (3 Points)                                                [ ] Heparin induced thrombocytopenia                  (3 Points)                                     Total Score [        8  ]

## 2023-02-21 ENCOUNTER — RX RENEWAL (OUTPATIENT)
Age: 73
End: 2023-02-21

## 2023-02-26 ENCOUNTER — OUTPATIENT (OUTPATIENT)
Dept: OUTPATIENT SERVICES | Facility: HOSPITAL | Age: 73
LOS: 1 days | End: 2023-02-26
Payer: MEDICARE

## 2023-02-26 DIAGNOSIS — Z98.890 OTHER SPECIFIED POSTPROCEDURAL STATES: Chronic | ICD-10-CM

## 2023-02-26 DIAGNOSIS — Z90.49 ACQUIRED ABSENCE OF OTHER SPECIFIED PARTS OF DIGESTIVE TRACT: Chronic | ICD-10-CM

## 2023-02-26 DIAGNOSIS — Z11.52 ENCOUNTER FOR SCREENING FOR COVID-19: ICD-10-CM

## 2023-02-26 LAB — SARS-COV-2 RNA SPEC QL NAA+PROBE: SIGNIFICANT CHANGE UP

## 2023-02-26 PROCEDURE — U0003: CPT

## 2023-02-26 PROCEDURE — U0005: CPT

## 2023-02-26 PROCEDURE — C9803: CPT

## 2023-02-28 ENCOUNTER — TRANSCRIPTION ENCOUNTER (OUTPATIENT)
Age: 73
End: 2023-02-28

## 2023-03-01 ENCOUNTER — TRANSCRIPTION ENCOUNTER (OUTPATIENT)
Age: 73
End: 2023-03-01

## 2023-03-01 ENCOUNTER — OUTPATIENT (OUTPATIENT)
Dept: INPATIENT UNIT | Facility: HOSPITAL | Age: 73
LOS: 1 days | End: 2023-03-01
Payer: MEDICARE

## 2023-03-01 ENCOUNTER — APPOINTMENT (OUTPATIENT)
Dept: ORTHOPEDIC SURGERY | Facility: HOSPITAL | Age: 73
End: 2023-03-01

## 2023-03-01 VITALS — SYSTOLIC BLOOD PRESSURE: 193 MMHG | HEART RATE: 122 BPM | DIASTOLIC BLOOD PRESSURE: 114 MMHG

## 2023-03-01 DIAGNOSIS — Z98.890 OTHER SPECIFIED POSTPROCEDURAL STATES: Chronic | ICD-10-CM

## 2023-03-01 DIAGNOSIS — M17.12 UNILATERAL PRIMARY OSTEOARTHRITIS, LEFT KNEE: ICD-10-CM

## 2023-03-01 DIAGNOSIS — Z90.49 ACQUIRED ABSENCE OF OTHER SPECIFIED PARTS OF DIGESTIVE TRACT: Chronic | ICD-10-CM

## 2023-03-01 PROCEDURE — 73560 X-RAY EXAM OF KNEE 1 OR 2: CPT | Mod: 26,LT

## 2023-03-01 DEVICE — IMP PATELLA SYMMETRIC 31X9MM: Type: IMPLANTABLE DEVICE | Site: LEFT | Status: FUNCTIONAL

## 2023-03-01 DEVICE — MAKO BONE PIN 4MM X 140MM: Type: IMPLANTABLE DEVICE | Site: LEFT | Status: FUNCTIONAL

## 2023-03-01 DEVICE — IMPLANTABLE DEVICE: Type: IMPLANTABLE DEVICE | Site: LEFT | Status: FUNCTIONAL

## 2023-03-01 DEVICE — MAKO BONE PIN 4MM X 110MM: Type: IMPLANTABLE DEVICE | Site: LEFT | Status: FUNCTIONAL

## 2023-03-01 DEVICE — COMP FEM CR CMNTLSS BEADED W/PA SZ 1 LT: Type: IMPLANTABLE DEVICE | Site: LEFT | Status: FUNCTIONAL

## 2023-03-01 DEVICE — BASEPLATE TIB TRIATHLON TRITAN SZ 1: Type: IMPLANTABLE DEVICE | Site: LEFT | Status: FUNCTIONAL

## 2023-03-01 RX ORDER — ACETAMINOPHEN 500 MG
975 TABLET ORAL EVERY 8 HOURS
Refills: 0 | Status: ACTIVE | OUTPATIENT
Start: 2023-03-02 | End: 2024-01-29

## 2023-03-01 RX ORDER — SODIUM CHLORIDE 9 MG/ML
500 INJECTION, SOLUTION INTRAVENOUS ONCE
Refills: 0 | Status: COMPLETED | OUTPATIENT
Start: 2023-03-01 | End: 2023-03-01

## 2023-03-01 RX ORDER — ACETAMINOPHEN 500 MG
1000 TABLET ORAL ONCE
Refills: 0 | Status: ACTIVE | OUTPATIENT
Start: 2023-03-02 | End: 2023-03-02

## 2023-03-01 RX ORDER — SODIUM CHLORIDE 9 MG/ML
1000 INJECTION, SOLUTION INTRAVENOUS
Refills: 0 | Status: DISCONTINUED | OUTPATIENT
Start: 2023-03-01 | End: 2023-03-01

## 2023-03-01 RX ORDER — LEVOTHYROXINE SODIUM 125 MCG
175 TABLET ORAL DAILY
Refills: 0 | Status: ACTIVE | OUTPATIENT
Start: 2023-03-01 | End: 2024-01-28

## 2023-03-01 RX ORDER — VANCOMYCIN HCL 1 G
1000 VIAL (EA) INTRAVENOUS ONCE
Refills: 0 | Status: COMPLETED | OUTPATIENT
Start: 2023-03-02 | End: 2023-03-02

## 2023-03-01 RX ORDER — OXYCODONE HYDROCHLORIDE 5 MG/1
10 TABLET ORAL
Refills: 0 | Status: DISCONTINUED | OUTPATIENT
Start: 2023-03-01 | End: 2023-03-01

## 2023-03-01 RX ORDER — TRAMADOL HYDROCHLORIDE 50 MG/1
1 TABLET ORAL
Qty: 0 | Refills: 0 | DISCHARGE
Start: 2023-03-01

## 2023-03-01 RX ORDER — LOSARTAN POTASSIUM 100 MG/1
50 TABLET, FILM COATED ORAL DAILY
Refills: 0 | Status: ACTIVE | OUTPATIENT
Start: 2023-03-01 | End: 2024-01-28

## 2023-03-01 RX ORDER — CELECOXIB 200 MG/1
200 CAPSULE ORAL EVERY 12 HOURS
Refills: 0 | Status: ACTIVE | OUTPATIENT
Start: 2023-03-02 | End: 2024-01-29

## 2023-03-01 RX ORDER — ONDANSETRON 8 MG/1
4 TABLET, FILM COATED ORAL ONCE
Refills: 0 | Status: DISCONTINUED | OUTPATIENT
Start: 2023-03-01 | End: 2023-03-01

## 2023-03-01 RX ORDER — MAGNESIUM HYDROXIDE 400 MG/1
30 TABLET, CHEWABLE ORAL DAILY
Refills: 0 | Status: ACTIVE | OUTPATIENT
Start: 2023-03-01 | End: 2024-01-28

## 2023-03-01 RX ORDER — ASPIRIN/CALCIUM CARB/MAGNESIUM 324 MG
81 TABLET ORAL
Refills: 0 | Status: ACTIVE | OUTPATIENT
Start: 2023-03-01 | End: 2024-01-28

## 2023-03-01 RX ORDER — KETOROLAC TROMETHAMINE 30 MG/ML
15 SYRINGE (ML) INJECTION EVERY 6 HOURS
Refills: 0 | Status: DISCONTINUED | OUTPATIENT
Start: 2023-03-01 | End: 2023-03-01

## 2023-03-01 RX ORDER — ACETAMINOPHEN 500 MG
1000 TABLET ORAL ONCE
Refills: 0 | Status: COMPLETED | OUTPATIENT
Start: 2023-03-01 | End: 2023-03-01

## 2023-03-01 RX ORDER — ONDANSETRON 8 MG/1
4 TABLET, FILM COATED ORAL EVERY 6 HOURS
Refills: 0 | Status: ACTIVE | OUTPATIENT
Start: 2023-03-01 | End: 2024-01-28

## 2023-03-01 RX ORDER — TRAMADOL HYDROCHLORIDE 50 MG/1
50 TABLET ORAL EVERY 6 HOURS
Refills: 0 | Status: DISCONTINUED | OUTPATIENT
Start: 2023-03-01 | End: 2023-03-01

## 2023-03-01 RX ORDER — MELOXICAM 15 MG/1
1 TABLET ORAL
Qty: 0 | Refills: 0 | DISCHARGE

## 2023-03-01 RX ORDER — OXYCODONE HYDROCHLORIDE 5 MG/1
5 TABLET ORAL EVERY 4 HOURS
Refills: 0 | Status: DISCONTINUED | OUTPATIENT
Start: 2023-03-01 | End: 2023-03-01

## 2023-03-01 RX ORDER — ACETAMINOPHEN 500 MG
1000 TABLET ORAL ONCE
Refills: 0 | Status: DISCONTINUED | OUTPATIENT
Start: 2023-03-01 | End: 2023-03-01

## 2023-03-01 RX ORDER — PANTOPRAZOLE SODIUM 20 MG/1
40 TABLET, DELAYED RELEASE ORAL
Refills: 0 | Status: ACTIVE | OUTPATIENT
Start: 2023-03-01 | End: 2024-01-28

## 2023-03-01 RX ORDER — OXYCODONE HYDROCHLORIDE 5 MG/1
5 TABLET ORAL
Refills: 0 | Status: DISCONTINUED | OUTPATIENT
Start: 2023-03-01 | End: 2023-03-01

## 2023-03-01 RX ORDER — HYDROMORPHONE HYDROCHLORIDE 2 MG/ML
0.5 INJECTION INTRAMUSCULAR; INTRAVENOUS; SUBCUTANEOUS
Refills: 0 | Status: DISCONTINUED | OUTPATIENT
Start: 2023-03-01 | End: 2023-03-01

## 2023-03-01 RX ORDER — HYDRALAZINE HCL 50 MG
5 TABLET ORAL ONCE
Refills: 0 | Status: ACTIVE | OUTPATIENT
Start: 2023-03-01 | End: 2024-01-28

## 2023-03-01 RX ORDER — KETOROLAC TROMETHAMINE 30 MG/ML
15 SYRINGE (ML) INJECTION EVERY 6 HOURS
Refills: 0 | Status: ACTIVE | OUTPATIENT
Start: 2023-03-01 | End: 2023-03-02

## 2023-03-01 RX ORDER — SODIUM CHLORIDE 9 MG/ML
500 INJECTION, SOLUTION INTRAVENOUS ONCE
Refills: 0 | Status: COMPLETED | OUTPATIENT
Start: 2023-03-01 | End: 2023-03-02

## 2023-03-01 RX ORDER — LOSARTAN POTASSIUM 100 MG/1
1 TABLET, FILM COATED ORAL
Qty: 0 | Refills: 0 | DISCHARGE

## 2023-03-01 RX ORDER — POLYETHYLENE GLYCOL 3350 17 G/17G
17 POWDER, FOR SOLUTION ORAL AT BEDTIME
Refills: 0 | Status: ACTIVE | OUTPATIENT
Start: 2023-03-01 | End: 2024-01-28

## 2023-03-01 RX ORDER — OXYCODONE HYDROCHLORIDE 5 MG/1
10 TABLET ORAL EVERY 4 HOURS
Refills: 0 | Status: DISCONTINUED | OUTPATIENT
Start: 2023-03-01 | End: 2023-03-01

## 2023-03-01 RX ORDER — SENNA PLUS 8.6 MG/1
2 TABLET ORAL AT BEDTIME
Refills: 0 | Status: ACTIVE | OUTPATIENT
Start: 2023-03-01 | End: 2024-01-28

## 2023-03-01 RX ORDER — LEVOTHYROXINE SODIUM 125 MCG
1 TABLET ORAL
Qty: 0 | Refills: 0 | DISCHARGE

## 2023-03-01 RX ORDER — DEXAMETHASONE 0.5 MG/5ML
8 ELIXIR ORAL ONCE
Refills: 0 | Status: COMPLETED | OUTPATIENT
Start: 2023-03-02 | End: 2023-03-02

## 2023-03-01 RX ORDER — ACETAMINOPHEN 500 MG
1000 TABLET ORAL ONCE
Refills: 0 | Status: COMPLETED | OUTPATIENT
Start: 2023-03-02 | End: 2023-03-02

## 2023-03-01 RX ADMIN — OXYCODONE HYDROCHLORIDE 5 MILLIGRAM(S): 5 TABLET ORAL at 18:18

## 2023-03-01 RX ADMIN — Medication 81 MILLIGRAM(S): at 18:18

## 2023-03-01 RX ADMIN — OXYCODONE HYDROCHLORIDE 5 MILLIGRAM(S): 5 TABLET ORAL at 18:45

## 2023-03-01 RX ADMIN — Medication 15 MILLIGRAM(S): at 00:00

## 2023-03-01 RX ADMIN — OXYCODONE HYDROCHLORIDE 10 MILLIGRAM(S): 5 TABLET ORAL at 23:08

## 2023-03-01 RX ADMIN — Medication 400 MILLIGRAM(S): at 23:07

## 2023-03-01 RX ADMIN — Medication 1000 MILLIGRAM(S): at 23:37

## 2023-03-01 RX ADMIN — SODIUM CHLORIDE 500 MILLILITER(S): 9 INJECTION, SOLUTION INTRAVENOUS at 23:07

## 2023-03-01 RX ADMIN — SODIUM CHLORIDE 75 MILLILITER(S): 9 INJECTION, SOLUTION INTRAVENOUS at 18:18

## 2023-03-01 RX ADMIN — POLYETHYLENE GLYCOL 3350 17 GRAM(S): 17 POWDER, FOR SOLUTION ORAL at 21:24

## 2023-03-01 RX ADMIN — TRAMADOL HYDROCHLORIDE 50 MILLIGRAM(S): 50 TABLET ORAL at 13:57

## 2023-03-01 RX ADMIN — Medication 250 MILLIGRAM(S): at 13:09

## 2023-03-01 RX ADMIN — OXYCODONE HYDROCHLORIDE 10 MILLIGRAM(S): 5 TABLET ORAL at 23:38

## 2023-03-01 RX ADMIN — HYDROMORPHONE HYDROCHLORIDE 0.5 MILLIGRAM(S): 2 INJECTION INTRAMUSCULAR; INTRAVENOUS; SUBCUTANEOUS at 16:54

## 2023-03-01 RX ADMIN — HYDROMORPHONE HYDROCHLORIDE 0.5 MILLIGRAM(S): 2 INJECTION INTRAMUSCULAR; INTRAVENOUS; SUBCUTANEOUS at 16:39

## 2023-03-01 RX ADMIN — SODIUM CHLORIDE 500 MILLILITER(S): 9 INJECTION, SOLUTION INTRAVENOUS at 18:18

## 2023-03-01 RX ADMIN — SENNA PLUS 2 TABLET(S): 8.6 TABLET ORAL at 21:24

## 2023-03-01 RX ADMIN — Medication 15 MILLIGRAM(S): at 21:24

## 2023-03-01 RX ADMIN — SODIUM CHLORIDE 3 MILLILITER(S): 9 INJECTION INTRAMUSCULAR; INTRAVENOUS; SUBCUTANEOUS at 13:04

## 2023-03-01 NOTE — DISCHARGE NOTE PROVIDER - CARE PROVIDER_API CALL
Luis Ghosh)  Orthopaedic Surgery  [address], [address]  [place] [address]  Phone: [phone]  Fax: [phone]  Established Patient  Follow Up Time: 2 weeks

## 2023-03-01 NOTE — CHART NOTE - NSCHARTNOTEFT_GEN_A_CORE
Patient seen and evaluated in the recovery unit. Resting on the bed c/o back pain since arrival to the unit that may be secondary to positioning while in the OR. Pt scales her pain as a 6 or 7 out of 10 at the moment and will be given Oxycodone to then reassess her pain. PACU RN mentions that her BPs have been elevated, last SBP read at bedside was in the 170s. Otherwise, No Chest Pain, SOB, N/V/D/HA.    T(C): 36.1 (03-01-23 @ 16:13), Max: 36.6 (03-01-23 @ 11:56)  HR: 99 (03-01-23 @ 17:30) (94 - 122)  BP: 164/80 (03-01-23 @ 17:30) (144/67 - 193/114)  RR: 14 (03-01-23 @ 17:30) (14 - 20)  SpO2: 100% (03-01-23 @ 17:30) (97% - 100%)  Wt(kg): --    Exam:  Alert and Oriented, No Acute Distress.   Card: +S1/S2, RRR  Pulm: CTAB  Laterality: L Knee  Aquacel dressing on L Knee clean, dry and intact.   Sensory: Sensation intact to light touch   Motor: 5/5 (+) PF/DF/EHL/FHL  Calves soft, non-tender   2+ DP pulse  Lower extremities warm and well-perfused, cap refill < 3 sec.     Xray:   IMPRESSION: s/p L TKR with HOME assist  Unconstrained left total knee prosthesis implanted.  Intact and aligned hardware and no periprosthetic fractures.  Postoperative soft tissue changes.  Correlate with intraoperative findings.    A/P: 72yFemale S/p L TKR with HOME assist. VSS. NAD.  -PT/OT- WBAT on LLE/OOB   -IS bedside   -DVT PPx: Aspirin 81 mg BID, SCD, Early OOB and Amb  -GI PPx: Protonix 40 mg   -Pain Control  -Continue Current Tx  -f/u AM labs.   -Dispo planning: PACU to Floor, pending PT/OT eval.     Donnell Ramírez PA-C  Orthopedic Surgery Team  Team Pager #[phone]

## 2023-03-01 NOTE — PHYSICAL THERAPY INITIAL EVALUATION ADULT - ADDITIONAL COMMENTS
Per pt and dtr at bedside, pt lives in a house with  and dtr (can assist). Has 4 (-HR) +5 (+HR) steps to enter and 10 steps inside (+HR). Reports being independent with functional mobility/ADLs without AD. Owns cane, RW, and shower chair. -drive, -work.

## 2023-03-01 NOTE — DISCHARGE NOTE PROVIDER - HOSPITAL COURSE
History of Present Illness:   73 YO F PMH HTN, hypothyroid, GERD, osteoarthritis s/p left knee arthroscopy X2, presents for LEFT TOTAL KNEE ARTHROPLASTY WITH HOME ROBOT 3/1/2023. Denies any palpitations, SOB, N/V, Covid symptoms (fever, chills, cough) or exposure.   ***Covid test scheduled for 2/26/2023 at [facility].     Goals of Care: Pain relief and ROM improvement    PAST MEDICAL HISTORY:  2019 novel coronavirus disease (COVID-19)   Acid reflux   HTN (hypertension)   Hypothyroid   Osteoarthritis.     PAST SURGICAL HISTORY:  H/O colonoscopy   H/O endoscopy   H/O ovarian cystectomy   History of arthroscopy of right shoulder   History of cholecystectomy   S/P left knee arthroscopy.    Hospital Course:  After admission on 3/1/23 and receiving pre-operative parenteral prophylactic antibiotics, the patient underwent an uncomplicated left total knee replacement with HOME robotic assist with Dr. Ghosh. Patient tolerated the procedure well and was transferred to the recovery room in stable condition, with a stable neuro/vascular exam of the operated extremity.    Patient was placed on Aspirin 81 mg for DVT ppx, and was placed on Protonix 40 mg for GI protection.   Patient was made weight bearing as tolerated with the operative leg.     Typical Physical & occupational therapy modalities post surgery were performed by physical and occupational therapies, including ambulation training, range of motion, ADL's, abd transfers.  After progression of mobility guided by the PT/ OT staff,  the patient was felt to benefit from further rehabilitative care for restoration to level of function. This was felt to best be accomplished at ****  Discharge and Orthopedic Care instructions were delineated in the Discharge Plan and reviewed with the patient. All medications were delineated in the medication reconciliation tool and key points were reviewed with the patient. They were deemed stable from an Orthopedic & medical standpoint for discharge *** History of Present Illness:   73 YO F PMH HTN, hypothyroid, GERD, osteoarthritis s/p left knee arthroscopy X2, presents for LEFT TOTAL KNEE ARTHROPLASTY WITH HOME ROBOT 3/1/2023. Denies any palpitations, SOB, N/V, Covid symptoms (fever, chills, cough) or exposure.   ***[facility]     Goals of Care: Pain relief and ROM improvement    PAST MEDICAL HISTORY:  2019 novel coronavirus disease (COVID-19)   Acid reflux   HTN (hypertension)   Hypothyroid   Osteoarthritis.     PAST SURGICAL HISTORY:  H/O colonoscopy   H/O endoscopy   H/O ovarian cystectomy   History of arthroscopy of right shoulder   History of cholecystectomy   S/P left knee arthroscopy.    Hospital Course:  After admission on 3/1/23 and receiving pre-operative parenteral prophylactic antibiotics, the patient underwent an uncomplicated left total knee replacement with HOME robotic assist with Dr. Ghosh. Patient tolerated the procedure well and was transferred to the recovery room in stable condition, with a stable neuro/vascular exam of the operated extremity.    Patient was placed on Aspirin 81 mg for DVT ppx, and was placed on Protonix 40 mg for GI protection.   Patient was made weight bearing as tolerated with the operative leg.     Typical Physical & occupational therapy modalities post surgery were performed by physical and occupational therapies, including ambulation training, range of motion, ADL's, abd transfers.  After progression of mobility guided by the PT/ OT staff,  the patient was felt to benefit from further rehabilitative care for restoration to level of function. This was felt to best be accomplished at home.  Discharge and Orthopedic Care instructions were delineated in the Discharge Plan and reviewed with the patient. All medications were delineated in the medication reconciliation tool and key points were reviewed with the patient. They were deemed stable from an Orthopedic & medical standpoint for discharge home.

## 2023-03-01 NOTE — PHYSICAL THERAPY INITIAL EVALUATION ADULT - PERTINENT HX OF CURRENT PROBLEM, REHAB EVAL
73 YO F PMH HTN, hypothyroid, GERD, osteoarthritis s/p left knee arthroscopy X2, presents to PST for LEFT TOTAL KNEE ARTHROPLASTY WITH HOME ROBOT 3/1/2023. Denies any palpitations, SOB, N/V, Covid symptoms (fever, chills, cough) or exposure. Now s/p L TKR 3/1.

## 2023-03-01 NOTE — DISCHARGE NOTE PROVIDER - NSDCFUADDINST_GEN_ALL_CORE_FT
Please follow up with Dr. Ghosh at your pre-scheduled post-operative follow up appointment. (Call to confirm)  Dressing care:   -Keep Aquacel dressing clean, dry, and intact. Do not remove bandage until date written on bandage.   Continue to ambulate as tolerated to the left leg.     Pain medications:   Standing:         -Acetaminophen 325mg - 3 tabs every 8 hours        -Naproxen 500mg - 1 tab every 12 hours  As needed:        -Tramadol 50mg - 1 tab every 8-12 hours - Take only if needed for MODERATE pain       -oxycodone 5mg - 1 tab every 4-6 hours - Take only if needed for SEVERE or BREAKTHROUGH pain    Other Medications: (Standing)  -Aspirin (Enteric Coated) 81mg every 12 hours - to prevent blood clots (for 4 weeks post operatively.)  -Protonix 40mg - 1 tab every 24 hours - to prevent stomach irritation/ulcers  -Senna 8.6mg - 2 pills every 24 hours - stool softener  -colace 100mg - 1 pill every 8 hours - stool softener.     Recommended to follow up with your primary care provider within 1-2 months of hospital discharge to discuss your recent surgery and any change to your medications.

## 2023-03-01 NOTE — PHYSICAL THERAPY INITIAL EVALUATION ADULT - GAIT DEVIATIONS NOTED, PT EVAL
decreased gume/increased time in double stance/decreased step length/decreased stride length/decreased weight-shifting ability

## 2023-03-01 NOTE — DISCHARGE NOTE PROVIDER - NSDCFUSCHEDAPPT_GEN_ALL_CORE_FT
GENERAL: No fever or chills, EYES: no change in vision, HEENT: no trouble swallowing or speaking, CARDIAC: no chest pain, PULMONARY: no cough or SOB, GI: no abdominal pain, no nausea or no vomiting, no diarrhea or constipation, : No changes in urination, SKIN: no rashes, NEURO: no headache now,  MSK: No joint pain otherwise as HPI or negative. ~Coby Barnes M.D., Ph.D. -Resident
[facility]  ORTHOSURG [address]  Scheduled Appointment: 03/21/2023    Saima Barger  [facility]  ENDOCRIN [address]  Scheduled Appointment: 04/13/2023

## 2023-03-01 NOTE — CHART NOTE - NSCHARTNOTESELECT_GEN_ALL_CORE
Post Operative Check/Event Note HGB  6.6*  9.9*  8.7*  9.7*   HCT  19.7*  29.5*  26.2*  29.3*   PLT  90*   --   77*  87*   MCV  94.7   --   91.9  92.2       Recent Labs      10/20/18   0330  10/21/18   0350  10/22/18   0430   NA  138  140  139   K  4.0  3.5  3.9   CL  97*  99  99   CO2  24  26  26   BUN  55*  59*  59*   CREATININE  3.6*  3.6*  3.7*       No results for input(s): AST, ALT, ALB, BILIDIR, BILITOT, ALKPHOS in the last 72 hours. Recent Labs      10/21/18   0350   MG  1.50*       Radiology  Xr Chest Standard (2 Vw)    Result Date: 10/16/2018  Chest PA and lateral. HISTORY: Fall injury pain. COMPARISON: June 22, 2018 chest Heart size is normal Right port with tip in proximal right atrium unchanged Marked right lower lung pleural parenchymal opacity likely relates to pleural fluid, atelectasis and consolidation unchanged. Interval development of hazy consolidation left mid and lower lung Right apical pleural thickening unchanged. No significant change right lower lung pleural parenchymal opacity New left mid and lower lung opacity. Findings may relate to edema or pneumonia. Ct Head Wo Contrast    Result Date: 10/16/2018  CT HEAD WITHOUT CONTRAST HISTORY: Fall with head injury COMPARISON: 10/4/2018 Underexposure controls were utilized during the CT examination to meet ALARA standards for radiation dose reduction. FINDINGS: Mild cerebral atrophy is seen and stable from prior study with mild periventricular low-attenuation suggesting chronic ischemic myelopathy. There is no evidence of intracranial hemorrhage or abnormal extra-axial fluid collection. There are no definite signs to suggest acute infarction. However, if there is concern of early infarction or other subtle intracranial abnormality, MRI correlation should be considered. Visualized portions of the paranasal sinuses appear clear. No evidence of acute intracranial abnormality.        Ct Chest Wo Contrast    Result Date: 10/16/2018  EXAM: CT CHEST WITHOUT CONTRAST mmHg assuming a right   atrial pressure of 8 mmHg.      Signature      ------------------------------------------------------------------   Electronically signed by Vu Rojas MD (Interpreting   [id number]) on 10/17/2018 at 04:47 PM    Pathology  Department of Pathology  FINAL SURGICAL PATHOLOGY REPORT  Patient Name: Nestor Joshi        Accession No:  [id number]   Age Sex:   1947    70 Y / M      Location:      [facility] 01  Account No:   [de-identified]                Collected:     2018  Med Rec No:    [id number]                Received:      2018  Attend Phys:   Geovanna King MD            Completed:     2018  Perform Phys: Yamila Medel MD      FINAL DIAGNOSIS:    Right chest wall tumor, excisional biopsy:     - 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     - See Comment. COMMENT:   Examination of the excision specimen shows diffuse involvement  by a poorly differentiated non-small cell malignancy, consisting of  medium to large cells having vesicular chromatin, 1 to 2 nucleoli, and  round to slightly irregularly shaped nuclei. These cells are arranged in  sheets without a discernible architectural pattern having somewhat  vacuolated-appearing cytoplasm. Focal spindling of these cells is noted,  as well as punctate areas of necrosis. Mitotic figures are easily  identified. The tumor focally appears to invade foci of accompanying  pulmonary (alveolar) parenchyma. In order to further characterize this neoplasm, a panel of  immunoperoxidase stains was performed on a selected paraffin block  (pankeratin, CK-7, calretinin, D2-40, CK-5/6, CK-20, TTF-1, LCA, CD20,  HMB-45, and MART-1). The tumor was markedly positive with pankeratin,  CK-7, calretinin, with focal positivity being noted on the D2-40 (weak),  and patchy positivity noted on the CK-5/6.  The tumor was negative for  CD20 and LCA

## 2023-03-01 NOTE — DISCHARGE NOTE PROVIDER - NSDCMRMEDTOKEN_GEN_ALL_CORE_FT
losartan 50 mg oral tablet: 1 tab(s) orally once a day  pantoprazole 40 mg oral delayed release tablet: 1 tab(s) orally once a day  Synthroid 175 mcg (0.175 mg) oral tablet: 1 tab(s) orally once a day  traMADol 50 mg oral tablet: 1 tab(s) orally once   acetaminophen 325 mg oral tablet: 3 tab(s) orally every 8 hours x 7 days, then as needed  aspirin 81 mg oral delayed release tablet: 1 tab(s) orally 2 times a day x 30 days MDD:2  losartan 50 mg oral tablet: 1 tab(s) orally once a day  Multiple Vitamins oral tablet: 1 tab(s) orally once a day  naproxen 500 mg oral tablet: 1 tab(s) orally 2 times a day x 14 days MDD:2  Narcan 4 mg/0.1 mL nasal spray: 1 spray(s) nasal every 2 minutes, As Needed alternate nostrils  oxyCODONE 5 mg oral tablet: 1 or 2 tab(s) orally every 4 hours, as needed for moderate to severe pain MDD:6  pantoprazole 40 mg oral delayed release tablet: 1 tab(s) orally once a day MDD:1  polyethylene glycol 3350 oral powder for reconstitution: 17 gram(s) orally once a day (at bedtime), As Needed  senna leaf extract oral tablet: 2 tab(s) orally once a day (at bedtime)  Synthroid 175 mcg (0.175 mg) oral tablet: 1 tab(s) orally once a day  traMADol 50 mg oral tablet: 1 tab(s) orally every 6 hours, As needed, Mild Pain (1 - 3) MDD:4

## 2023-03-01 NOTE — PRE-ANESTHESIA EVALUATION ADULT - NSANTHPMHFT_GEN_ALL_CORE
71 YO F PMH HTN, hypothyroid, GERD, osteoarthritis s/p left knee arthroscopy X2, presents to PST for LEFT TOTAL KNEE ARTHROPLASTY WITH HOME ROBOT 3/1/2023. Denies any palpitations, SOB, N/V, Covid symptoms (fever, chills, cough) or exposure.

## 2023-03-02 ENCOUNTER — TRANSCRIPTION ENCOUNTER (OUTPATIENT)
Age: 73
End: 2023-03-02

## 2023-03-02 VITALS — TEMPERATURE: 98 F | OXYGEN SATURATION: 97 % | RESPIRATION RATE: 18 BRPM

## 2023-03-02 LAB
ANION GAP SERPL CALC-SCNC: 12 MMOL/L — SIGNIFICANT CHANGE UP (ref 5–17)
BUN SERPL-MCNC: 21 MG/DL — SIGNIFICANT CHANGE UP (ref 7–23)
CALCIUM SERPL-MCNC: 9.1 MG/DL — SIGNIFICANT CHANGE UP (ref 8.4–10.5)
CHLORIDE SERPL-SCNC: 101 MMOL/L — SIGNIFICANT CHANGE UP (ref 96–108)
CO2 SERPL-SCNC: 25 MMOL/L — SIGNIFICANT CHANGE UP (ref 22–31)
CREAT SERPL-MCNC: 0.61 MG/DL — SIGNIFICANT CHANGE UP (ref 0.5–1.3)
EGFR: 94 ML/MIN/1.73M2 — SIGNIFICANT CHANGE UP
GLUCOSE SERPL-MCNC: 163 MG/DL — HIGH (ref 70–99)
HCT VFR BLD CALC: 36.4 % — SIGNIFICANT CHANGE UP (ref 34.5–45)
HGB BLD-MCNC: 12 G/DL — SIGNIFICANT CHANGE UP (ref 11.5–15.5)
MCHC RBC-ENTMCNC: 28.6 PG — SIGNIFICANT CHANGE UP (ref 27–34)
MCHC RBC-ENTMCNC: 33 GM/DL — SIGNIFICANT CHANGE UP (ref 32–36)
MCV RBC AUTO: 86.7 FL — SIGNIFICANT CHANGE UP (ref 80–100)
NRBC # BLD: 0 /100 WBCS — SIGNIFICANT CHANGE UP (ref 0–0)
PLATELET # BLD AUTO: 293 K/UL — SIGNIFICANT CHANGE UP (ref 150–400)
POTASSIUM SERPL-MCNC: 4.6 MMOL/L — SIGNIFICANT CHANGE UP (ref 3.5–5.3)
POTASSIUM SERPL-SCNC: 4.6 MMOL/L — SIGNIFICANT CHANGE UP (ref 3.5–5.3)
RBC # BLD: 4.2 M/UL — SIGNIFICANT CHANGE UP (ref 3.8–5.2)
RBC # FLD: 13.1 % — SIGNIFICANT CHANGE UP (ref 10.3–14.5)
SODIUM SERPL-SCNC: 138 MMOL/L — SIGNIFICANT CHANGE UP (ref 135–145)
WBC # BLD: 11.44 K/UL — HIGH (ref 3.8–10.5)
WBC # FLD AUTO: 11.44 K/UL — HIGH (ref 3.8–10.5)

## 2023-03-02 PROCEDURE — 97116 GAIT TRAINING THERAPY: CPT

## 2023-03-02 PROCEDURE — 97165 OT EVAL LOW COMPLEX 30 MIN: CPT

## 2023-03-02 PROCEDURE — C1713: CPT

## 2023-03-02 PROCEDURE — 97530 THERAPEUTIC ACTIVITIES: CPT

## 2023-03-02 PROCEDURE — 27447 TOTAL KNEE ARTHROPLASTY: CPT | Mod: LT

## 2023-03-02 PROCEDURE — 73560 X-RAY EXAM OF KNEE 1 OR 2: CPT

## 2023-03-02 PROCEDURE — S2900: CPT

## 2023-03-02 PROCEDURE — 82962 GLUCOSE BLOOD TEST: CPT

## 2023-03-02 PROCEDURE — 97161 PT EVAL LOW COMPLEX 20 MIN: CPT

## 2023-03-02 PROCEDURE — 97110 THERAPEUTIC EXERCISES: CPT

## 2023-03-02 PROCEDURE — C1776: CPT

## 2023-03-02 RX ORDER — ACETAMINOPHEN 500 MG
3 TABLET ORAL
Qty: 0 | Refills: 0 | DISCHARGE
Start: 2023-03-02

## 2023-03-02 RX ORDER — TRAMADOL HYDROCHLORIDE 50 MG/1
1 TABLET ORAL
Qty: 28 | Refills: 0
Start: 2023-03-02 | End: 2023-03-08

## 2023-03-02 RX ORDER — POLYETHYLENE GLYCOL 3350 17 G/17G
17 POWDER, FOR SOLUTION ORAL
Qty: 0 | Refills: 0 | DISCHARGE
Start: 2023-03-02

## 2023-03-02 RX ORDER — ASPIRIN/CALCIUM CARB/MAGNESIUM 324 MG
1 TABLET ORAL
Qty: 60 | Refills: 0
Start: 2023-03-02 | End: 2023-03-31

## 2023-03-02 RX ORDER — PANTOPRAZOLE SODIUM 20 MG/1
1 TABLET, DELAYED RELEASE ORAL
Qty: 30 | Refills: 0
Start: 2023-03-02 | End: 2023-03-31

## 2023-03-02 RX ORDER — OXYCODONE HYDROCHLORIDE 5 MG/1
1 TABLET ORAL
Qty: 0 | Refills: 0 | DISCHARGE
Start: 2023-03-02

## 2023-03-02 RX ORDER — TRAMADOL HYDROCHLORIDE 50 MG/1
1 TABLET ORAL
Qty: 0 | Refills: 0 | DISCHARGE
Start: 2023-03-02

## 2023-03-02 RX ORDER — NALOXONE HYDROCHLORIDE 4 MG/.1ML
1 SPRAY NASAL
Qty: 1 | Refills: 0
Start: 2023-03-02

## 2023-03-02 RX ORDER — ASPIRIN/CALCIUM CARB/MAGNESIUM 324 MG
1 TABLET ORAL
Qty: 0 | Refills: 0 | DISCHARGE
Start: 2023-03-02

## 2023-03-02 RX ORDER — NALOXONE HYDROCHLORIDE 4 MG/.1ML
1 SPRAY NASAL
Qty: 0 | Refills: 0 | DISCHARGE

## 2023-03-02 RX ORDER — PANTOPRAZOLE SODIUM 20 MG/1
1 TABLET, DELAYED RELEASE ORAL
Qty: 0 | Refills: 0 | DISCHARGE

## 2023-03-02 RX ORDER — OXYCODONE HYDROCHLORIDE 5 MG/1
1 TABLET ORAL
Qty: 40 | Refills: 0
Start: 2023-03-02

## 2023-03-02 RX ORDER — SENNA PLUS 8.6 MG/1
2 TABLET ORAL
Qty: 0 | Refills: 0 | DISCHARGE
Start: 2023-03-02

## 2023-03-02 RX ADMIN — Medication 250 MILLIGRAM(S): at 01:56

## 2023-03-02 RX ADMIN — SODIUM CHLORIDE 500 MILLILITER(S): 9 INJECTION, SOLUTION INTRAVENOUS at 06:01

## 2023-03-02 RX ADMIN — Medication 400 MILLIGRAM(S): at 08:10

## 2023-03-02 RX ADMIN — Medication 1000 MILLIGRAM(S): at 08:30

## 2023-03-02 RX ADMIN — Medication 101.6 MILLIGRAM(S): at 08:06

## 2023-03-02 RX ADMIN — Medication 81 MILLIGRAM(S): at 06:01

## 2023-03-02 RX ADMIN — Medication 15 MILLIGRAM(S): at 04:45

## 2023-03-02 RX ADMIN — Medication 15 MILLIGRAM(S): at 05:15

## 2023-03-02 RX ADMIN — Medication 175 MICROGRAM(S): at 08:06

## 2023-03-02 RX ADMIN — Medication 1 TABLET(S): at 11:24

## 2023-03-02 RX ADMIN — Medication 15 MILLIGRAM(S): at 11:24

## 2023-03-02 RX ADMIN — PANTOPRAZOLE SODIUM 40 MILLIGRAM(S): 20 TABLET, DELAYED RELEASE ORAL at 06:01

## 2023-03-02 RX ADMIN — LOSARTAN POTASSIUM 50 MILLIGRAM(S): 100 TABLET, FILM COATED ORAL at 06:01

## 2023-03-02 NOTE — PROGRESS NOTE ADULT - ASSESSMENT
71 y/o FM s/p left total knee arthroplasty(HOME) POD#1, physical therapy, d/c planning  Yohana Christian PA-C  Orthopaedic Surgery  Team pager [phone]  [facility] [phone]  [phone]

## 2023-03-02 NOTE — DISCHARGE NOTE NURSING/CASE MANAGEMENT/SOCIAL WORK - PATIENT PORTAL LINK FT
You can access the FollowMyHealth Patient Portal offered by [facility] by registering at the following website: [email or web address]. By joining [email or web address] FollowMyHealth portal, you will also be able to view your health information using other applications (apps) compatible with our system.

## 2023-03-02 NOTE — OCCUPATIONAL THERAPY INITIAL EVALUATION ADULT - PERTINENT HX OF CURRENT PROBLEM, REHAB EVAL
71 YO F PMH HTN, hypothyroid, GERD, osteoarthritis s/p left knee arthroscopy X2, presents to PST for LEFT TOTAL KNEE ARTHROPLASTY WITH HOME ROBOT 3/1/2023. Denies any palpitations, SOB, N/V, Covid symptoms (fever, chills, cough) or exposure. Now s/p L TKR 3/1.

## 2023-03-02 NOTE — PROGRESS NOTE ADULT - SUBJECTIVE AND OBJECTIVE BOX
Patient is a 72y old  Female who presents with a chief complaint of Left knee pain, arthritis  Patient s/p left total knee arthroplasty(HOME) POD#1  Patient comfortable  No complaints    T(C): 36.6 (03-02-23 @ 04:21), Max: 36.6 (03-01-23 @ 11:56)  HR: 62 (03-02-23 @ 04:21) (62 - 122)  BP: 122/72 (03-02-23 @ 04:21) (108/68 - 193/114)  RR: 18 (03-02-23 @ 04:21) (12 - 20)  SpO2: 98% (03-02-23 @ 04:21) (94% - 100%)    PHYSICAL EXAM:  NAD, Alert  [Left ] Knee: Aquacel Dressing C/D/I; sensation grossly intact to light touch; (+) DF/PF; (+) Distal Pulses; No Calf tenderness B/L, PAS     LABS:                      12.0   11.44 )-----------( 293      ( 02 Mar 2023 06:06 )             36.4

## 2023-03-02 NOTE — OCCUPATIONAL THERAPY INITIAL EVALUATION ADULT - ADDITIONAL COMMENTS
Pt lives in PH w/  and dtr; 4+5 MELISA and 10 steps inside (5 and 5, split ranch); Pt lives in PH w/  and dtr; 4+5 MELISA and 10 steps inside (5 and 5, split ranch); owns cane, RW and tub w/ shower chair. PTA pt was independent with ADLs

## 2023-03-21 ENCOUNTER — APPOINTMENT (OUTPATIENT)
Dept: ORTHOPEDIC SURGERY | Facility: CLINIC | Age: 73
End: 2023-03-21
Payer: MEDICARE

## 2023-03-21 PROBLEM — K21.9 GASTRO-ESOPHAGEAL REFLUX DISEASE WITHOUT ESOPHAGITIS: Chronic | Status: ACTIVE | Noted: 2023-02-17

## 2023-03-21 PROBLEM — I10 ESSENTIAL (PRIMARY) HYPERTENSION: Chronic | Status: ACTIVE | Noted: 2023-02-17

## 2023-03-21 PROBLEM — E03.9 HYPOTHYROIDISM, UNSPECIFIED: Chronic | Status: ACTIVE | Noted: 2023-02-17

## 2023-03-21 PROBLEM — U07.1 COVID-19: Chronic | Status: ACTIVE | Noted: 2023-02-17

## 2023-03-21 PROBLEM — M19.90 UNSPECIFIED OSTEOARTHRITIS, UNSPECIFIED SITE: Chronic | Status: ACTIVE | Noted: 2023-02-17

## 2023-03-21 PROCEDURE — 73564 X-RAY EXAM KNEE 4 OR MORE: CPT | Mod: 26,LT

## 2023-03-21 PROCEDURE — 99024 POSTOP FOLLOW-UP VISIT: CPT

## 2023-03-21 NOTE — HISTORY OF PRESENT ILLNESS
[de-identified] : Status-post left total knee  arthroplasty here for initial postoperative evaluation. Excellent progress is noted in terms of pain and restoration of function. Pain is well controlled with oral medications. There has been no change in medical health since discharge. The patient does require assistive devices.

## 2023-03-21 NOTE — DISCUSSION/SUMMARY
[de-identified] : The patient is doing well after joint replacement surgery. Written infectious precautions were reviewed. The patient will progress with physical therapy at this time and they will work on transitioning from requiring assistive devices for ambulation. Anti-coagulant therapy will be discontinued at 1 month post surgery for the purpose of orthopedic thromboembolism prophylaxis. Return around the 6 week anniversary from surgery for follow-up evaluation.

## 2023-03-21 NOTE — PHYSICAL EXAM
[de-identified] : Well developed, well nourished in no apparent distress, awake, alert and orientated to person, place and time with appropriate mood and affect\par Respirations are even and unlabored. Gait evaluation does not reveal a limp. There is no inguinal adenopathy. The affected limb is well-perfused with palpable pedal pulse, without skin lesions, shows a grossly normal motor and sensory examination. Incision is healed. Knee motion is 0-100 [de-identified] : AP, lateral, sunrise knee x-rays of the left knee were ordered and obtained in the office and demonstrate satisfactory position and alignment of the components are present. No signs of loosening are seen.

## 2023-04-13 ENCOUNTER — APPOINTMENT (OUTPATIENT)
Dept: ENDOCRINOLOGY | Facility: CLINIC | Age: 73
End: 2023-04-13
Payer: MEDICARE

## 2023-04-13 VITALS
HEART RATE: 90 BPM | RESPIRATION RATE: 16 BRPM | TEMPERATURE: 97.3 F | DIASTOLIC BLOOD PRESSURE: 70 MMHG | HEIGHT: 60 IN | SYSTOLIC BLOOD PRESSURE: 128 MMHG | BODY MASS INDEX: 32.39 KG/M2 | WEIGHT: 165 LBS | OXYGEN SATURATION: 98 %

## 2023-04-13 PROCEDURE — 99214 OFFICE O/P EST MOD 30 MIN: CPT | Mod: 25

## 2023-04-13 PROCEDURE — 36415 COLL VENOUS BLD VENIPUNCTURE: CPT

## 2023-04-13 NOTE — ASSESSMENT
[FreeTextEntry1] : 1. Hypothyroidism\par - continue Synthroid 175 mcg, pending results\par - repeat thyroid functions today and readjust the dose if needed. Possibly will switch to 175 mg qd\par - Proper intake of levothyroxine is reviewed in details, including on an empty stomach, with water only, at least one hour before taking any medications, vitamins, or supplements and three-four hours before taking iron or calcium.\par \par 2. Negative screening for osteoporosis\par - calcium 500 mg bid, add extra OTC vitamin D3 2,000 IU/day\par - continue weight-bearing exercises; advised avoiding high risk sports\par - DXA 3 sites in 1/24\par \par 3. Obesity\par - Current approaches to weight management are discussed with the patient. \par Suggested extensive nutritional education program. Proper dietary restrictions and exercise routines discussed. Medical weight loss therapies were reviewed with the patient. Will try Wegovy 0.25 mg qw and uptitrate. R+B\par \par \par RTC 3-6 mos, depending on the labs\par \par

## 2023-04-13 NOTE — HISTORY OF PRESENT ILLNESS
[FreeTextEntry1] : F/u for hypothyroidism management\par \par *** Apr 13, 2023 ***\par \par s/p left TKR on 03/1/23, still undergoing PT\par struggles with the weight loss, feeling impact of extra weight on her joints\par remains on Synthroid 175 mcg\par \par *** Jul 14, 2022 ***\par \par feels more tired lately\par remains on Synthroid 200 mcg 6/week\par had a blood work with her GI on 06/30- TSH 6.56\par \par *** Jan 28, 2022 ***\par \par Labs from  1/14/22- TSH- 0.68, a1c- 5.8%\par \par Thyroid US (1/14/22)- markedly atrophic gland. no nodules\par DXA (1/14/22)- FN (+0.8), LS (+1.6), radius 1/3 (-0.7)\par \par \par *** Initial Televisit  Jan 06, 2022  (scheduled TELE because of the COVID exposure) ***\par \par \par 71 year female  referred for hypothyroidism management. \par Ms. HARTMANN  was diagnosed with hypothyroidism more than 20 years ago. She was previously under care of Dr. Lucas.\par Most recently on Synthroid brand 200 mcg 6 days a week.\par She's been feeling more tired recently, with some neck discomfort.\par Denies family history of thyroid cancer or history of radiation exposure to head and neck area in a childhood. Her sister and a niece are presumably treated for an underactive thyroid. \par She recalls her last DXA done more than 2 years ago,  and did not have a thyroid US done yet

## 2023-04-14 LAB
25(OH)D3 SERPL-MCNC: 28.4 NG/ML
ALBUMIN SERPL ELPH-MCNC: 4.6 G/DL
ALP BLD-CCNC: 97 U/L
ALT SERPL-CCNC: 19 U/L
ANION GAP SERPL CALC-SCNC: 13 MMOL/L
AST SERPL-CCNC: 18 U/L
BILIRUB SERPL-MCNC: 0.2 MG/DL
BUN SERPL-MCNC: 27 MG/DL
CALCIUM SERPL-MCNC: 9.7 MG/DL
CHLORIDE SERPL-SCNC: 105 MMOL/L
CO2 SERPL-SCNC: 24 MMOL/L
CREAT SERPL-MCNC: 0.68 MG/DL
EGFR: 91 ML/MIN/1.73M2
ESTIMATED AVERAGE GLUCOSE: 114 MG/DL
GLUCOSE SERPL-MCNC: 87 MG/DL
HBA1C MFR BLD HPLC: 5.6 %
POTASSIUM SERPL-SCNC: 4.5 MMOL/L
PROT SERPL-MCNC: 6.9 G/DL
SODIUM SERPL-SCNC: 142 MMOL/L
T4 FREE SERPL-MCNC: 1.5 NG/DL
TSH SERPL-ACNC: 0.93 UIU/ML

## 2023-04-24 RX ORDER — GLUCOSAMINE HCL 500 MG
75 MCG TABLET ORAL DAILY
Qty: 30 | Refills: 0 | Status: ACTIVE | COMMUNITY
Start: 2023-04-24 | End: 1900-01-01

## 2023-04-28 ENCOUNTER — APPOINTMENT (OUTPATIENT)
Dept: ORTHOPEDIC SURGERY | Facility: CLINIC | Age: 73
End: 2023-04-28
Payer: MEDICARE

## 2023-04-28 VITALS — HEIGHT: 60 IN | WEIGHT: 165 LBS | BODY MASS INDEX: 32.39 KG/M2

## 2023-04-28 DIAGNOSIS — M17.12 UNILATERAL PRIMARY OSTEOARTHRITIS, LEFT KNEE: ICD-10-CM

## 2023-04-28 DIAGNOSIS — Z96.652 PRESENCE OF LEFT ARTIFICIAL KNEE JOINT: ICD-10-CM

## 2023-04-28 PROCEDURE — 99024 POSTOP FOLLOW-UP VISIT: CPT

## 2023-04-28 RX ORDER — TRAMADOL HYDROCHLORIDE 50 MG/1
50 TABLET, COATED ORAL EVERY 8 HOURS
Qty: 21 | Refills: 0 | Status: ACTIVE | COMMUNITY
Start: 2023-03-10 | End: 1900-01-01

## 2023-04-28 NOTE — HISTORY OF PRESENT ILLNESS
[de-identified] : Status-post left total knee  arthroplasty here for routine postoperative evaluation. Excellent progress is noted in terms of pain and restoration of function. Pain is well controlled with oral medications. There has been no change in medical health since discharge. The patient does not require assistive devices.

## 2023-04-28 NOTE — DISCUSSION/SUMMARY
[de-identified] : The patient is doing well after joint replacement surgery. WBAT. Return around the 6 month anniversary from surgery for follow-up evaluation.

## 2023-04-28 NOTE — PHYSICAL EXAM
[de-identified] : Well developed, well nourished in no apparent distress, awake, alert and orientated to person, place and time with appropriate mood and affect\par Respirations are even and unlabored. Gait evaluation does not reveal a limp. There is no inguinal adenopathy. The affected limb is well-perfused with palpable pedal pulse, without skin lesions, shows a grossly normal motor and sensory examination. Incision is healed. Knee motion is 0-120

## 2023-05-05 ENCOUNTER — RX RENEWAL (OUTPATIENT)
Age: 73
End: 2023-05-05

## 2023-06-23 ENCOUNTER — OFFICE (OUTPATIENT)
Dept: URBAN - METROPOLITAN AREA CLINIC 63 | Facility: CLINIC | Age: 73
Setting detail: OPHTHALMOLOGY
End: 2023-06-23
Payer: MEDICARE

## 2023-06-23 DIAGNOSIS — H16.223: ICD-10-CM

## 2023-06-23 DIAGNOSIS — H40.053: ICD-10-CM

## 2023-06-23 DIAGNOSIS — H52.4: ICD-10-CM

## 2023-06-23 DIAGNOSIS — H35.033: ICD-10-CM

## 2023-06-23 DIAGNOSIS — H25.13: ICD-10-CM

## 2023-06-23 PROCEDURE — 92133 CPTRZD OPH DX IMG PST SGM ON: CPT | Performed by: STUDENT IN AN ORGANIZED HEALTH CARE EDUCATION/TRAINING PROGRAM

## 2023-06-23 PROCEDURE — 92012 INTRM OPH EXAM EST PATIENT: CPT | Performed by: STUDENT IN AN ORGANIZED HEALTH CARE EDUCATION/TRAINING PROGRAM

## 2023-06-23 PROCEDURE — 92015 DETERMINE REFRACTIVE STATE: CPT | Performed by: STUDENT IN AN ORGANIZED HEALTH CARE EDUCATION/TRAINING PROGRAM

## 2023-06-23 ASSESSMENT — AXIALLENGTH_DERIVED
OS_AL: 22.0095
OS_AL: 22.052
OD_AL: 21.8879
OD_AL: 21.9299
OD_AL: 22.2718
OS_AL: 22.3539
OS_AL: 22.0095
OD_AL: 21.972

## 2023-06-23 ASSESSMENT — REFRACTION_CURRENTRX
OS_CYLINDER: -1.00
OD_OVR_VA: 20/
OD_CYLINDER: -1.00
OD_AXIS: 073
OS_ADD: +2.75
OS_AXIS: 071
OD_ADD: +2.75
OS_OVR_VA: 20/
OS_SPHERE: +4.50
OD_SPHERE: +4.50

## 2023-06-23 ASSESSMENT — KERATOMETRY
OS_AXISANGLE_DEGREES: 154
OD_K2POWER_DIOPTERS: 44.50
OS_K2POWER_DIOPTERS: 44.25
OS_K1POWER_DIOPTERS: 43.50
OD_K1POWER_DIOPTERS: 43.75
OD_AXISANGLE_DEGREES: 169

## 2023-06-23 ASSESSMENT — REFRACTION_MANIFEST
OS_CYLINDER: -1.00
OS_SPHERE: +3.25
OD_CYLINDER: -1.00
OU_VA: 20/20
OU_VA: 20/20
OS_ADD: +2.75
OS_SPHERE: +4.50
OD_VA1: 20/20
OS_AXIS: 085
OD_ADD: +2.50
OS_VA1: 20/20
OD_CYLINDER: -0.50
OD_AXIS: 075
OD_SPHERE: +3.25
OS_CYLINDER: -0.50
OS_SPHERE: +4.50
OD_SPHERE: +4.50
OD_AXIS: 075
OS_CYLINDER: -1.00
OD_SPHERE: +4.75
OS_AXIS: 070
OS_AXIS: 075
OD_VA1: 20/20
OD_AXIS: 082
OD_CYLINDER: -1.25
OS_ADD: +2.50
OD_ADD: +2.75
OS_VA1: 20/20

## 2023-06-23 ASSESSMENT — SUPERFICIAL PUNCTATE KERATITIS (SPK)
OD_SPK: 1+
OS_SPK: 1+

## 2023-06-23 ASSESSMENT — REFRACTION_AUTOREFRACTION
OS_CYLINDER: -1.25
OS_SPHERE: +4.50
OD_SPHERE: +4.75
OS_AXIS: 083
OD_AXIS: 076
OD_CYLINDER: -1.75

## 2023-06-23 ASSESSMENT — PACHYMETRY
OD_CT_UM: 557
OD_CT_CORRECTION: -1
OS_CT_CORRECTION: -1
OS_CT_UM: 558

## 2023-06-23 ASSESSMENT — SPHEQUIV_DERIVED
OS_SPHEQUIV: 4
OD_SPHEQUIV: 4.125
OD_SPHEQUIV: 4
OD_SPHEQUIV: 3
OS_SPHEQUIV: 3.875
OD_SPHEQUIV: 3.875
OS_SPHEQUIV: 3
OS_SPHEQUIV: 4

## 2023-06-23 ASSESSMENT — VISUAL ACUITY
OD_BCVA: 20/20-1
OS_BCVA: 20/20-1

## 2023-07-11 ENCOUNTER — APPOINTMENT (OUTPATIENT)
Dept: ORTHOPEDIC SURGERY | Facility: CLINIC | Age: 73
End: 2023-07-11
Payer: MEDICARE

## 2023-07-11 VITALS — WEIGHT: 167 LBS | HEIGHT: 60 IN | BODY MASS INDEX: 32.79 KG/M2

## 2023-07-11 PROCEDURE — 99214 OFFICE O/P EST MOD 30 MIN: CPT | Mod: 25

## 2023-07-11 PROCEDURE — 73564 X-RAY EXAM KNEE 4 OR MORE: CPT | Mod: RT

## 2023-07-11 PROCEDURE — 20610 DRAIN/INJ JOINT/BURSA W/O US: CPT | Mod: RT

## 2023-07-11 NOTE — DISCUSSION/SUMMARY
[de-identified] : This patient has right knee osteoarthritis.  The patient is not an appropriate candidate for surgical intervention at this time. An extensive discussion was conducted on the natural history of the disease and the variety of surgical and non-surgical options available to the patient including, but not limited to non-steroidal anti-inflammatory medications, steroid injections, physical therapy, maintenance of ideal body weight, and reduction of activity.  Today we performed a right knee intra-articular cortisone injection.  I recommended and prescribed a course of Mobic and physical therapy.  The patient is also encouraged to trial a neoprene sleeve knee brace which can be purchased OTC.  The patient is also encouraged to consider use of a cane. The patient will schedule an appointment as needed.\par \par Informed consent for the right knee injection was obtained. All questions were answered. A time out was performed. The right knee was prepped and draped in sterile fashion. Using sterile technique, the right knee was injected with 80mg of Kenalog, 4cc of 1% lidocaine, 4cc of 0.25% marcaine using a 21-gauge needle. A sterile dressing was applied. Post injection instructions were reviewed. The patient tolerated the procedure well.\par

## 2023-07-11 NOTE — PHYSICAL EXAM
[de-identified] : Patient is well nourished, well-developed, in no acute distress, with appropriate mood and affect. The patient is oriented to time, place, and person. Respirations are even and unlabored. Gait evaluation does reveal a limp. There is no inguinal adenopathy. Examination of the contralateral knee shows normal range of motion, strength, no tenderness, and intact skin. The affected limb is well-perfused, without skin lesions, shows a grossly normal motor and sensory examination. Knee motion is significantly reduced and does cause significant pain. The knee moves from 0 to 125 degrees. The knee is stable within that range-of-motion to AP and ML stress. The alignment of the knee is 5 degrees varus. Muscle strength is normal. Pedal pulses are palpable. Hip examination was negative.\par \par  [de-identified] : Long standing knee, AP knee, lateral knee, and patellar views of the right knee were ordered and taken in the office and demonstrate degenerative joint disease of the knee with joint space narrowing, osteophyte formation, and subchondral sclerosis.

## 2023-07-11 NOTE — HISTORY OF PRESENT ILLNESS
[de-identified] : This is very nice 72-year-old female experiencing right knee pain, which is severe in intensity.  This is a new problem for her.  The pain substantially limits activities of daily living. Walking tolerance is reduced. Medication and activity modification have been minimally effective for a period lasting greater than three months in duration. Assistive devices and external support were not deemed by the patient to be helpful in improving their function. Due to the severity of osteoarthritis and level of pain, physical therapy is contraindicated. Pain and restriction of function are intolerable at this time. The patient denies any radiation of the pain to the feet and it is not associated with numbness, tingling, or weakness.  Left total knee arthroplasty is feeling very well.

## 2023-08-17 ENCOUNTER — OFFICE (OUTPATIENT)
Dept: URBAN - METROPOLITAN AREA CLINIC 63 | Facility: CLINIC | Age: 73
Setting detail: OPHTHALMOLOGY
End: 2023-08-17
Payer: MEDICARE

## 2023-08-17 DIAGNOSIS — H25.13: ICD-10-CM

## 2023-08-17 DIAGNOSIS — H16.223: ICD-10-CM

## 2023-08-17 DIAGNOSIS — H35.033: ICD-10-CM

## 2023-08-17 DIAGNOSIS — H40.053: ICD-10-CM

## 2023-08-17 PROCEDURE — 92012 INTRM OPH EXAM EST PATIENT: CPT | Performed by: STUDENT IN AN ORGANIZED HEALTH CARE EDUCATION/TRAINING PROGRAM

## 2023-08-17 ASSESSMENT — REFRACTION_MANIFEST
OS_CYLINDER: -1.00
OS_SPHERE: +4.50
OS_VA1: 20/20
OS_VA1: 20/20
OD_CYLINDER: -1.25
OS_SPHERE: +3.25
OD_VA1: 20/20
OS_ADD: +2.50
OS_ADD: +2.75
OD_AXIS: 082
OD_CYLINDER: -1.00
OS_AXIS: 070
OD_ADD: +2.75
OS_CYLINDER: -1.00
OD_SPHERE: +4.75
OD_CYLINDER: -0.50
OU_VA: 20/20
OD_ADD: +2.50
OD_SPHERE: +3.25
OD_VA1: 20/20
OS_AXIS: 085
OD_SPHERE: +4.50
OS_SPHERE: +4.50
OS_CYLINDER: -0.50
OD_AXIS: 075
OS_AXIS: 075
OD_AXIS: 075
OU_VA: 20/20

## 2023-08-17 ASSESSMENT — TONOMETRY
OD_IOP_MMHG: 20
OS_IOP_MMHG: 19

## 2023-08-17 ASSESSMENT — SPHEQUIV_DERIVED
OS_SPHEQUIV: 4
OD_SPHEQUIV: 4.125
OS_SPHEQUIV: 3
OD_SPHEQUIV: 4.5
OD_SPHEQUIV: 4
OS_SPHEQUIV: 4
OD_SPHEQUIV: 3
OS_SPHEQUIV: 3.875

## 2023-08-17 ASSESSMENT — REFRACTION_CURRENTRX
OS_SPHERE: +4.50
OS_AXIS: 071
OD_CYLINDER: -1.00
OS_CYLINDER: -1.00
OS_OVR_VA: 20/
OD_AXIS: 073
OD_SPHERE: +4.50
OD_OVR_VA: 20/
OD_ADD: +2.75
OS_ADD: +2.75

## 2023-08-17 ASSESSMENT — KERATOMETRY
OD_AXISANGLE_DEGREES: 018
OD_K2POWER_DIOPTERS: 44.00
OD_K1POWER_DIOPTERS: 43.50
OS_AXISANGLE_DEGREES: 159
OS_K2POWER_DIOPTERS: 44.50
OS_K1POWER_DIOPTERS: 43.50

## 2023-08-17 ASSESSMENT — CONFRONTATIONAL VISUAL FIELD TEST (CVF)
OD_FINDINGS: FULL
OS_FINDINGS: FULL

## 2023-08-17 ASSESSMENT — AXIALLENGTH_DERIVED
OD_AL: 22.3952
OS_AL: 22.3128
OS_AL: 21.9696
OS_AL: 22.0119
OD_AL: 22.0071
OD_AL: 22.0495
OD_AL: 21.8807
OS_AL: 21.9696

## 2023-08-17 ASSESSMENT — SUPERFICIAL PUNCTATE KERATITIS (SPK)
OD_SPK: 1+
OS_SPK: 1+

## 2023-08-17 ASSESSMENT — PACHYMETRY
OS_CT_CORRECTION: -1
OD_CT_UM: 557
OD_CT_CORRECTION: -1
OS_CT_UM: 558

## 2023-08-17 ASSESSMENT — REFRACTION_AUTOREFRACTION
OS_AXIS: 076
OD_SPHERE: +5.00
OS_CYLINDER: -1.25
OD_CYLINDER: -1.00
OS_SPHERE: +4.50
OD_AXIS: 097

## 2023-08-17 ASSESSMENT — VISUAL ACUITY
OD_BCVA: 20/30
OS_BCVA: 20/25-1

## 2023-08-23 ENCOUNTER — APPOINTMENT (OUTPATIENT)
Dept: ENDOCRINOLOGY | Facility: CLINIC | Age: 73
End: 2023-08-23
Payer: MEDICARE

## 2023-08-23 VITALS
HEART RATE: 81 BPM | HEIGHT: 60 IN | RESPIRATION RATE: 16 BRPM | DIASTOLIC BLOOD PRESSURE: 80 MMHG | OXYGEN SATURATION: 98 % | SYSTOLIC BLOOD PRESSURE: 130 MMHG | BODY MASS INDEX: 33.57 KG/M2 | WEIGHT: 171 LBS

## 2023-08-23 PROCEDURE — 99214 OFFICE O/P EST MOD 30 MIN: CPT | Mod: 25

## 2023-08-23 PROCEDURE — 36415 COLL VENOUS BLD VENIPUNCTURE: CPT

## 2023-08-23 NOTE — HISTORY OF PRESENT ILLNESS
[FreeTextEntry1] : F/u for hypothyroidism management  *** Aug 23, 2023 ***  feels well, except for occasional  nighttime neck discomfort. reports a few episodes of chocking sensation on solid food. saw GI 2 months- reports a normal work up snores. staying on L-thyroxine 175 mcg qd tried wegogy 0.25 mg sample with some weight loss, but the medication was denied  *** Apr 13, 2023 ***  s/p left TKR on 03/1/23, still undergoing PT struggles with the weight loss, feeling impact of extra weight on her joints remains on Synthroid 175 mcg  *** Jul 14, 2022 ***  feels more tired lately remains on Synthroid 200 mcg 6/week had a blood work with her GI on 06/30- TSH 6.56  *** Jan 28, 2022 ***  Labs from  1/14/22- TSH- 0.68, a1c- 5.8%  Thyroid US (1/14/22)- markedly atrophic gland. no nodules DXA (1/14/22)- FN (+0.8), LS (+1.6), radius 1/3 (-0.7)   *** Initial Televisit  Jan 06, 2022  (scheduled TELE because of the COVID exposure) ***   71 year female  referred for hypothyroidism management.  Ms. HARTMANN  was diagnosed with hypothyroidism more than 20 years ago. She was previously under care of Dr. Lucas. Most recently on Synthroid brand 200 mcg 6 days a week. She's been feeling more tired recently, with some neck discomfort. Denies family history of thyroid cancer or history of radiation exposure to head and neck area in a childhood. Her sister and a niece are presumably treated for an underactive thyroid.  She recalls her last DXA done more than 2 years ago,  and did not have a thyroid US done yet

## 2023-08-23 NOTE — ASSESSMENT
[FreeTextEntry1] : 1. Hypothyroidism - continue Synthroid 175 mcg, pending results. Will switch to Synthroid Delivers - repeat thyroid functions today and readjust the dose if needed. - Proper intake of levothyroxine is reviewed in details, including on an empty stomach, with water only, at least one hour before taking any medications, vitamins, or supplements and three-four hours before taking iron or calcium.  2. Negative screening for osteoporosis - calcium 500 mg bid, add extra OTC vitamin D3 2,000 IU/day - continue weight-bearing exercises; advised avoiding high risk sports - DXA 3 sites in 1/24  3. Obesity - Current approaches to weight management are discussed with the patient.  Suggested extensive nutritional education program. Proper dietary restrictions and exercise routines discussed. Medical weight loss therapies were reviewed with the patient. Success on Wegovy 0.25 mg qw samples. Will rx Ozempic (R+B) f/u GI. advised on SAI evaluation RTC 3-6 mos, depending on the labs

## 2023-08-25 ENCOUNTER — APPOINTMENT (OUTPATIENT)
Dept: ULTRASOUND IMAGING | Facility: CLINIC | Age: 73
End: 2023-08-25
Payer: MEDICARE

## 2023-08-25 PROCEDURE — 76536 US EXAM OF HEAD AND NECK: CPT

## 2023-08-28 LAB
ALBUMIN SERPL ELPH-MCNC: 4.3 G/DL
ALP BLD-CCNC: 76 U/L
ALT SERPL-CCNC: 26 U/L
ANION GAP SERPL CALC-SCNC: 13 MMOL/L
AST SERPL-CCNC: 22 U/L
BILIRUB SERPL-MCNC: 0.2 MG/DL
BUN SERPL-MCNC: 25 MG/DL
CALCIUM SERPL-MCNC: 9.3 MG/DL
CHLORIDE SERPL-SCNC: 103 MMOL/L
CO2 SERPL-SCNC: 25 MMOL/L
CREAT SERPL-MCNC: 0.71 MG/DL
EGFR: 89 ML/MIN/1.73M2
ESTIMATED AVERAGE GLUCOSE: 120 MG/DL
GLUCOSE SERPL-MCNC: 78 MG/DL
HBA1C MFR BLD HPLC: 5.8 %
POTASSIUM SERPL-SCNC: 4.7 MMOL/L
PROT SERPL-MCNC: 6.5 G/DL
SODIUM SERPL-SCNC: 141 MMOL/L
T4 FREE SERPL-MCNC: 1.7 NG/DL
TSH SERPL-ACNC: 0.82 UIU/ML

## 2023-09-01 ENCOUNTER — NON-APPOINTMENT (OUTPATIENT)
Age: 73
End: 2023-09-01

## 2023-09-19 ENCOUNTER — APPOINTMENT (OUTPATIENT)
Dept: SLEEP CENTER | Facility: CLINIC | Age: 73
End: 2023-09-19
Payer: MEDICARE

## 2023-09-19 ENCOUNTER — OUTPATIENT (OUTPATIENT)
Dept: OUTPATIENT SERVICES | Facility: HOSPITAL | Age: 73
LOS: 1 days | End: 2023-09-19
Payer: MEDICARE

## 2023-09-19 DIAGNOSIS — Z90.49 ACQUIRED ABSENCE OF OTHER SPECIFIED PARTS OF DIGESTIVE TRACT: Chronic | ICD-10-CM

## 2023-09-19 DIAGNOSIS — Z98.890 OTHER SPECIFIED POSTPROCEDURAL STATES: Chronic | ICD-10-CM

## 2023-09-19 PROCEDURE — 95800 SLP STDY UNATTENDED: CPT | Mod: 26

## 2023-09-19 PROCEDURE — 95800 SLP STDY UNATTENDED: CPT

## 2023-09-29 ENCOUNTER — NON-APPOINTMENT (OUTPATIENT)
Age: 73
End: 2023-09-29

## 2023-09-29 DIAGNOSIS — G47.33 OBSTRUCTIVE SLEEP APNEA (ADULT) (PEDIATRIC): ICD-10-CM

## 2023-10-10 ENCOUNTER — APPOINTMENT (OUTPATIENT)
Dept: ORTHOPEDIC SURGERY | Facility: CLINIC | Age: 73
End: 2023-10-10

## 2023-10-23 ENCOUNTER — NON-APPOINTMENT (OUTPATIENT)
Age: 73
End: 2023-10-23

## 2023-10-27 ENCOUNTER — APPOINTMENT (OUTPATIENT)
Dept: PULMONOLOGY | Facility: CLINIC | Age: 73
End: 2023-10-27
Payer: MEDICARE

## 2023-10-27 VITALS
SYSTOLIC BLOOD PRESSURE: 190 MMHG | BODY MASS INDEX: 33.57 KG/M2 | OXYGEN SATURATION: 99 % | HEART RATE: 89 BPM | HEIGHT: 60 IN | WEIGHT: 171 LBS | DIASTOLIC BLOOD PRESSURE: 119 MMHG | TEMPERATURE: 97.3 F

## 2023-10-27 PROCEDURE — 99203 OFFICE O/P NEW LOW 30 MIN: CPT

## 2023-11-10 ENCOUNTER — APPOINTMENT (OUTPATIENT)
Dept: ORTHOPEDIC SURGERY | Facility: CLINIC | Age: 73
End: 2023-11-10
Payer: MEDICARE

## 2023-11-10 VITALS — WEIGHT: 175 LBS | BODY MASS INDEX: 33.04 KG/M2 | HEIGHT: 61 IN

## 2023-11-10 PROCEDURE — 99214 OFFICE O/P EST MOD 30 MIN: CPT | Mod: 25

## 2023-11-10 PROCEDURE — 20610 DRAIN/INJ JOINT/BURSA W/O US: CPT | Mod: RT

## 2023-11-17 ENCOUNTER — APPOINTMENT (OUTPATIENT)
Dept: PULMONOLOGY | Facility: CLINIC | Age: 73
End: 2023-11-17
Payer: MEDICARE

## 2023-11-17 VITALS
HEART RATE: 63 BPM | WEIGHT: 173 LBS | TEMPERATURE: 97.7 F | BODY MASS INDEX: 32.66 KG/M2 | HEIGHT: 61 IN | OXYGEN SATURATION: 97 % | SYSTOLIC BLOOD PRESSURE: 176 MMHG | DIASTOLIC BLOOD PRESSURE: 83 MMHG

## 2023-11-17 PROCEDURE — 99213 OFFICE O/P EST LOW 20 MIN: CPT

## 2023-11-28 ENCOUNTER — APPOINTMENT (OUTPATIENT)
Dept: ENDOCRINOLOGY | Facility: CLINIC | Age: 73
End: 2023-11-28
Payer: MEDICARE

## 2023-11-28 VITALS
BODY MASS INDEX: 32.28 KG/M2 | HEART RATE: 80 BPM | SYSTOLIC BLOOD PRESSURE: 173 MMHG | DIASTOLIC BLOOD PRESSURE: 86 MMHG | HEIGHT: 61 IN | WEIGHT: 171 LBS | OXYGEN SATURATION: 98 % | TEMPERATURE: 97.8 F | RESPIRATION RATE: 16 BRPM

## 2023-11-28 DIAGNOSIS — Z13.820 ENCOUNTER FOR SCREENING FOR OSTEOPOROSIS: ICD-10-CM

## 2023-11-28 PROCEDURE — 36415 COLL VENOUS BLD VENIPUNCTURE: CPT

## 2023-11-28 PROCEDURE — 99214 OFFICE O/P EST MOD 30 MIN: CPT | Mod: 25

## 2023-12-01 LAB
25(OH)D3 SERPL-MCNC: 25.9 NG/ML
ALBUMIN SERPL ELPH-MCNC: 4.3 G/DL
ALP BLD-CCNC: 75 U/L
ALT SERPL-CCNC: 26 U/L
ANION GAP SERPL CALC-SCNC: 12 MMOL/L
AST SERPL-CCNC: 21 U/L
BILIRUB SERPL-MCNC: 0.2 MG/DL
BUN SERPL-MCNC: 21 MG/DL
CALCIUM SERPL-MCNC: 9.4 MG/DL
CHLORIDE SERPL-SCNC: 102 MMOL/L
CHOLEST SERPL-MCNC: 193 MG/DL
CO2 SERPL-SCNC: 27 MMOL/L
CREAT SERPL-MCNC: 0.68 MG/DL
EGFR: 91 ML/MIN/1.73M2
ESTIMATED AVERAGE GLUCOSE: 108 MG/DL
GLUCOSE SERPL-MCNC: 72 MG/DL
HBA1C MFR BLD HPLC: 5.4 %
HDLC SERPL-MCNC: 48 MG/DL
LDLC SERPL CALC-MCNC: 120 MG/DL
NONHDLC SERPL-MCNC: 145 MG/DL
POTASSIUM SERPL-SCNC: 4 MMOL/L
PROT SERPL-MCNC: 6.4 G/DL
SODIUM SERPL-SCNC: 142 MMOL/L
T4 FREE SERPL-MCNC: 1.8 NG/DL
TRIGL SERPL-MCNC: 144 MG/DL
TSH SERPL-ACNC: 0.28 UIU/ML

## 2023-12-01 RX ORDER — LEVOTHYROXINE SODIUM 150 UG/1
150 TABLET ORAL DAILY
Qty: 90 | Refills: 2 | Status: ACTIVE | COMMUNITY
Start: 2023-05-05 | End: 1900-01-01

## 2023-12-27 ENCOUNTER — RX RENEWAL (OUTPATIENT)
Age: 73
End: 2023-12-27

## 2024-01-12 ENCOUNTER — RX RENEWAL (OUTPATIENT)
Age: 74
End: 2024-01-12

## 2024-01-17 ENCOUNTER — APPOINTMENT (OUTPATIENT)
Dept: PULMONOLOGY | Facility: CLINIC | Age: 74
End: 2024-01-17

## 2024-01-18 ENCOUNTER — APPOINTMENT (OUTPATIENT)
Dept: RADIOLOGY | Facility: CLINIC | Age: 74
End: 2024-01-18
Payer: MEDICARE

## 2024-01-18 PROCEDURE — 77080 DXA BONE DENSITY AXIAL: CPT

## 2024-01-20 ENCOUNTER — NON-APPOINTMENT (OUTPATIENT)
Age: 74
End: 2024-01-20

## 2024-02-16 ENCOUNTER — APPOINTMENT (OUTPATIENT)
Dept: ORTHOPEDIC SURGERY | Facility: CLINIC | Age: 74
End: 2024-02-16
Payer: MEDICARE

## 2024-02-16 VITALS
TEMPERATURE: 97.5 F | HEART RATE: 60 BPM | SYSTOLIC BLOOD PRESSURE: 168 MMHG | DIASTOLIC BLOOD PRESSURE: 87 MMHG | HEIGHT: 60 IN | WEIGHT: 170 LBS | BODY MASS INDEX: 33.38 KG/M2

## 2024-02-16 PROCEDURE — 73564 X-RAY EXAM KNEE 4 OR MORE: CPT | Mod: RT

## 2024-02-16 PROCEDURE — 20610 DRAIN/INJ JOINT/BURSA W/O US: CPT | Mod: RT

## 2024-02-16 PROCEDURE — 99214 OFFICE O/P EST MOD 30 MIN: CPT | Mod: 25

## 2024-02-16 NOTE — PHYSICAL EXAM
[de-identified] : Patient is well nourished, well-developed, in no acute distress, with appropriate mood and affect. The patient is oriented to time, place, and person. Respirations are even and unlabored. Gait evaluation does reveal a limp. There is no inguinal adenopathy. Examination of the contralateral knee shows normal range of motion, strength, no tenderness, and intact skin. The affected limb is well-perfused, without skin lesions, shows a grossly normal motor and sensory examination. Knee motion is significantly reduced and does cause significant pain. The knee moves from 0 to 125 degrees. The knee is stable within that range-of-motion to AP and ML stress. The alignment of the knee is 5 degrees varus. Muscle strength is normal. Pedal pulses are palpable. Hip examination was negative.\par  \par   [de-identified] :  Long standing knee, AP knee, lateral knee, and patellar views of the right knee were ordered and taken in the office and demonstrate degenerative joint disease of the knee with joint space narrowing, osteophyte formation, and subchondral sclerosis.

## 2024-02-16 NOTE — HISTORY OF PRESENT ILLNESS
[de-identified] : This is very nice 73-year-old female experiencing right knee pain, which is severe in intensity.  She has known right knee osteoarthritis.  The pain substantially limits activities of daily living. Walking tolerance is reduced. Injections have worked well in the past. Mobic and PT helped.  The patient denies any radiation of the pain to the feet and it is not associated with numbness, tingling, or weakness.  Left total knee arthroplasty is feeling very well.

## 2024-02-16 NOTE — DISCUSSION/SUMMARY
[de-identified] : This patient has right knee osteoarthritis.  [facility] elects to defer R TKA at this time.  An extensive discussion was conducted on the natural history of the disease and the variety of surgical and non-surgical options available to the patient including, but not limited to non-steroidal anti-inflammatory medications, steroid injections, physical therapy, maintenance of ideal body weight, and reduction of activity.  Today we performed a right knee intra-articular cortisone injection.  I recommended and prescribed a course of Mobic and physical therapy.  The patient is also encouraged to trial a neoprene sleeve knee brace which can be purchased OTC.  The patient is also encouraged to consider use of a cane. The patient will schedule an appointment as needed.  Informed consent for the right knee injection was obtained. All questions were answered. A time out was performed. The right knee was prepped and draped in sterile fashion. Using sterile technique, the right knee was injected with 80mg of Kenalog, 4cc of 1% lidocaine, 4cc of 0.25% marcaine using a 21-gauge needle. A sterile dressing was applied. Post injection instructions were reviewed. The patient tolerated the procedure well.

## 2024-02-29 ENCOUNTER — APPOINTMENT (OUTPATIENT)
Dept: ENDOCRINOLOGY | Facility: CLINIC | Age: 74
End: 2024-02-29
Payer: MEDICARE

## 2024-02-29 VITALS
TEMPERATURE: 97.6 F | OXYGEN SATURATION: 98 % | SYSTOLIC BLOOD PRESSURE: 120 MMHG | HEART RATE: 77 BPM | WEIGHT: 168 LBS | DIASTOLIC BLOOD PRESSURE: 71 MMHG | RESPIRATION RATE: 16 BRPM | BODY MASS INDEX: 32.98 KG/M2 | HEIGHT: 60 IN

## 2024-02-29 DIAGNOSIS — R53.83 OTHER FATIGUE: ICD-10-CM

## 2024-02-29 PROCEDURE — G2211 COMPLEX E/M VISIT ADD ON: CPT

## 2024-02-29 PROCEDURE — 99214 OFFICE O/P EST MOD 30 MIN: CPT

## 2024-02-29 PROCEDURE — 36415 COLL VENOUS BLD VENIPUNCTURE: CPT

## 2024-02-29 NOTE — ASSESSMENT
[FreeTextEntry1] : 1. Hypothyroidism - continue Synthroid 150 mcg, pending results. Will switch to Synthroid Delivers - repeat thyroid functions today and readjust the dose if needed. - Proper intake of levothyroxine is reviewed in details, including on an empty stomach, with water only, at least one hour before taking any medications, vitamins, or supplements and three-four hours before taking iron or calcium.  2. Mild osteopenia - calcium 500 mg bid, add extra OTC vitamin D3 2,000 IU/day - continue weight-bearing exercises; advised avoiding high risk sports - DXA 3 sites in 1/26  3. Obesity with a severe SAI - Current approaches to weight management are discussed with the patient. Weight loss is crucial Suggested extensive nutritional education program. Proper dietary restrictions and exercise routines discussed. Medical weight loss therapies were reviewed with the patient. Success on Wegovy 0.25 mg qw samples, but intolerant of Ozempic. Patient wants to retry Zepbound- will rx. R+B reviewed.  f/u GI.  - f/u with Dr. Benavides for CPAP adjustment RTC 3 mos, depending on the labs.

## 2024-02-29 NOTE — HISTORY OF PRESENT ILLNESS
[FreeTextEntry1] : F/u for multiple medical issues  *** Feb 29, 2024 ***  feels tired, lost a few lbs tried Mounjaro but stopped b/o stomach aches remains on Synthroid 150 mcg using CPAP  DXA(1/18/24)- LS (+1.3), LFN (+0.5), radius 33% (-1.0). FRAX- 6.6% and 0.5%  *** Nov 28, 2023 ***  stopped ozempic- had bloating, no weight loss staying on L-thyroxine 175 mcg qd no recent labs Home sleep study - c/w obstructive sleep apnea Saw Dr. Benavides - started on CPAP, but still not sleeping well and daytime hypersomnolence, dry mouth and occasional headaches in the morning  *** Aug 23, 2023 ***  feels well, except for occasional  nighttime neck discomfort. reports a few episodes of chocking sensation on solid food. saw GI 2 months- reports a normal work up snores. staying on L-thyroxine 175 mcg qd tried wegogy 0.25 mg sample with some weight loss, but the medication was denied  *** Apr 13, 2023 ***  s/p left TKR on 03/1/23, still undergoing PT struggles with the weight loss, feeling impact of extra weight on her joints remains on Synthroid 175 mcg  *** Jul 14, 2022 ***  feels more tired lately remains on Synthroid 200 mcg 6/week had a blood work with her GI on 06/30- TSH 6.56  *** Jan 28, 2022 ***  Labs from  1/14/22- TSH- 0.68, a1c- 5.8%  Thyroid US (1/14/22)- markedly atrophic gland. no nodules DXA (1/14/22)- FN (+0.8), LS (+1.6), radius 1/3 (-0.7)   *** Initial Televisit  Jan 06, 2022  (scheduled TELE because of the COVID exposure) ***   71 year female  referred for hypothyroidism management.  Ms. HARTMANN  was diagnosed with hypothyroidism more than 20 years ago. She was previously under care of Dr. Lucas. Most recently on Synthroid brand 200 mcg 6 days a week. She's been feeling more tired recently, with some neck discomfort. Denies family history of thyroid cancer or history of radiation exposure to head and neck area in a childhood. Her sister and a niece are presumably treated for an underactive thyroid.  She recalls her last DXA done more than 2 years ago,  and did not have a thyroid US done yet

## 2024-03-01 LAB
25(OH)D3 SERPL-MCNC: 30.9 NG/ML
ALBUMIN SERPL ELPH-MCNC: 4.2 G/DL
ALP BLD-CCNC: 87 U/L
ALT SERPL-CCNC: 27 U/L
ANION GAP SERPL CALC-SCNC: 12 MMOL/L
AST SERPL-CCNC: 20 U/L
BASOPHILS # BLD AUTO: 0.06 K/UL
BASOPHILS NFR BLD AUTO: 0.8 %
BILIRUB SERPL-MCNC: 0.3 MG/DL
BUN SERPL-MCNC: 24 MG/DL
CALCIUM SERPL-MCNC: 9.2 MG/DL
CHLORIDE SERPL-SCNC: 103 MMOL/L
CO2 SERPL-SCNC: 24 MMOL/L
CREAT SERPL-MCNC: 0.77 MG/DL
EGFR: 80 ML/MIN/1.73M2
EOSINOPHIL # BLD AUTO: 0.23 K/UL
EOSINOPHIL NFR BLD AUTO: 3 %
ESTIMATED AVERAGE GLUCOSE: 126 MG/DL
FOLATE SERPL-MCNC: 16.1 NG/ML
GLUCOSE SERPL-MCNC: 99 MG/DL
HBA1C MFR BLD HPLC: 6 %
HCT VFR BLD CALC: 39.8 %
HGB BLD-MCNC: 13 G/DL
IMM GRANULOCYTES NFR BLD AUTO: 0.1 %
IRON SATN MFR SERPL: 25 %
IRON SERPL-MCNC: 99 UG/DL
LYMPHOCYTES # BLD AUTO: 2.06 K/UL
LYMPHOCYTES NFR BLD AUTO: 27.3 %
MAN DIFF?: NORMAL
MCHC RBC-ENTMCNC: 29 PG
MCHC RBC-ENTMCNC: 32.7 GM/DL
MCV RBC AUTO: 88.6 FL
MONOCYTES # BLD AUTO: 0.58 K/UL
MONOCYTES NFR BLD AUTO: 7.7 %
NEUTROPHILS # BLD AUTO: 4.61 K/UL
NEUTROPHILS NFR BLD AUTO: 61.1 %
PLATELET # BLD AUTO: 351 K/UL
POTASSIUM SERPL-SCNC: 4.7 MMOL/L
PROT SERPL-MCNC: 6.5 G/DL
RBC # BLD: 4.49 M/UL
RBC # FLD: 14.3 %
SODIUM SERPL-SCNC: 138 MMOL/L
T4 FREE SERPL-MCNC: 1.4 NG/DL
TIBC SERPL-MCNC: 403 UG/DL
TSH SERPL-ACNC: 5.33 UIU/ML
UIBC SERPL-MCNC: 303 UG/DL
VIT B12 SERPL-MCNC: 424 PG/ML
WBC # FLD AUTO: 7.55 K/UL

## 2024-03-22 ENCOUNTER — APPOINTMENT (OUTPATIENT)
Dept: PULMONOLOGY | Facility: CLINIC | Age: 74
End: 2024-03-22
Payer: MEDICARE

## 2024-03-22 VITALS
OXYGEN SATURATION: 99 % | WEIGHT: 169 LBS | TEMPERATURE: 97.6 F | DIASTOLIC BLOOD PRESSURE: 74 MMHG | HEIGHT: 60 IN | BODY MASS INDEX: 33.18 KG/M2 | SYSTOLIC BLOOD PRESSURE: 149 MMHG | HEART RATE: 79 BPM

## 2024-03-22 DIAGNOSIS — G47.33 OBSTRUCTIVE SLEEP APNEA (ADULT) (PEDIATRIC): ICD-10-CM

## 2024-03-22 PROCEDURE — 99213 OFFICE O/P EST LOW 20 MIN: CPT

## 2024-03-22 RX ORDER — MIRABEGRON 50 MG/1
50 TABLET, FILM COATED, EXTENDED RELEASE ORAL
Qty: 30 | Refills: 6 | Status: COMPLETED | COMMUNITY
Start: 2019-08-26 | End: 2024-03-22

## 2024-03-22 RX ORDER — OMEPRAZOLE 40 MG/1
40 CAPSULE, DELAYED RELEASE ORAL
Refills: 0 | Status: ACTIVE | COMMUNITY

## 2024-03-22 RX ORDER — TIRZEPATIDE 2.5 MG/.5ML
2.5 INJECTION, SOLUTION SUBCUTANEOUS
Qty: 4 | Refills: 5 | Status: COMPLETED | COMMUNITY
Start: 2024-02-29 | End: 2024-03-22

## 2024-03-22 RX ORDER — SEMAGLUTIDE 0.5 MG/.5ML
0.5 INJECTION, SOLUTION SUBCUTANEOUS
Qty: 1 | Refills: 1 | Status: COMPLETED | COMMUNITY
Start: 2023-04-13 | End: 2024-03-22

## 2024-03-22 RX ORDER — AMLODIPINE BESYLATE 5 MG/1
5 TABLET ORAL
Refills: 0 | Status: ACTIVE | COMMUNITY

## 2024-03-22 RX ORDER — AZELASTINE HYDROCHLORIDE 137 UG/1
137 SPRAY, METERED NASAL DAILY
Qty: 3 | Refills: 3 | Status: COMPLETED | COMMUNITY
Start: 2022-02-03 | End: 2024-03-22

## 2024-03-22 RX ORDER — SEMAGLUTIDE 0.68 MG/ML
2 INJECTION, SOLUTION SUBCUTANEOUS
Qty: 1 | Refills: 3 | Status: COMPLETED | COMMUNITY
Start: 2023-08-23 | End: 2024-03-22

## 2024-03-22 RX ORDER — HYDROCHLOROTHIAZIDE 25 MG/1
25 TABLET ORAL
Refills: 0 | Status: ACTIVE | COMMUNITY

## 2024-03-22 RX ORDER — MELOXICAM 15 MG/1
15 TABLET ORAL DAILY
Qty: 30 | Refills: 1 | Status: COMPLETED | COMMUNITY
Start: 2022-08-18 | End: 2024-03-22

## 2024-03-22 RX ORDER — TIRZEPATIDE 2.5 MG/.5ML
2.5 INJECTION, SOLUTION SUBCUTANEOUS
Qty: 1 | Refills: 5 | Status: COMPLETED | COMMUNITY
Start: 2023-11-28 | End: 2024-03-22

## 2024-03-22 RX ORDER — FLUTICASONE PROPIONATE 50 UG/1
50 SPRAY, METERED NASAL
Refills: 0 | Status: ACTIVE | COMMUNITY

## 2024-03-22 RX ORDER — LOSARTAN POTASSIUM 100 MG/1
100 TABLET, FILM COATED ORAL
Refills: 0 | Status: ACTIVE | COMMUNITY

## 2024-03-22 RX ORDER — LEVOTHYROXINE SODIUM 175 UG/1
175 TABLET ORAL
Qty: 90 | Refills: 3 | Status: COMPLETED | COMMUNITY
Start: 2024-01-12 | End: 2024-03-22

## 2024-03-22 RX ORDER — MELOXICAM 15 MG/1
15 TABLET ORAL
Qty: 30 | Refills: 0 | Status: COMPLETED | COMMUNITY
Start: 2023-07-11 | End: 2024-03-22

## 2024-03-22 RX ORDER — MELOXICAM 15 MG/1
15 TABLET ORAL
Refills: 0 | Status: COMPLETED | COMMUNITY
End: 2024-03-22

## 2024-03-22 NOTE — PHYSICAL EXAM
[Heart Sounds] : normal S1 and S2 [Heart Rate And Rhythm] : heart rate was normal and rhythm regular [Heart Sounds Gallop] : no gallops [Murmurs] : no murmurs [Heart Sounds Pericardial Friction Rub] : no pericardial rub [] : no respiratory distress [Auscultation Breath Sounds / Voice Sounds] : lungs were clear to auscultation bilaterally

## 2024-03-22 NOTE — HISTORY OF PRESENT ILLNESS
[FreeTextEntry1] : Here for SAI followup. Reports no current respiratory symptoms or sleep symptoms. Using PAP on a nightly basis without difficulty. Reports no symptoms of daytime sleepiness. Getting supplies regularly.   Device: [employer]  Vendor:  [employer]   [id number]  Mask: full fame mask   Issues: has dry mouth in the early morning

## 2024-03-28 ENCOUNTER — APPOINTMENT (OUTPATIENT)
Dept: ORTHOPEDIC SURGERY | Facility: CLINIC | Age: 74
End: 2024-03-28
Payer: MEDICARE

## 2024-03-28 DIAGNOSIS — M19.011 PRIMARY OSTEOARTHRITIS, RIGHT SHOULDER: ICD-10-CM

## 2024-03-28 PROCEDURE — 73030 X-RAY EXAM OF SHOULDER: CPT | Mod: RT

## 2024-03-28 PROCEDURE — 99204 OFFICE O/P NEW MOD 45 MIN: CPT

## 2024-04-05 ENCOUNTER — APPOINTMENT (OUTPATIENT)
Dept: MRI IMAGING | Facility: CLINIC | Age: 74
End: 2024-04-05
Payer: MEDICARE

## 2024-04-05 PROCEDURE — 73221 MRI JOINT UPR EXTREM W/O DYE: CPT | Mod: RT

## 2024-04-19 ENCOUNTER — APPOINTMENT (OUTPATIENT)
Dept: ORTHOPEDIC SURGERY | Facility: CLINIC | Age: 74
End: 2024-04-19
Payer: MEDICARE

## 2024-04-19 DIAGNOSIS — M12.811 OTHER SPECIFIC ARTHROPATHIES, NOT ELSEWHERE CLASSIFIED, RIGHT SHOULDER: ICD-10-CM

## 2024-04-19 PROCEDURE — 99213 OFFICE O/P EST LOW 20 MIN: CPT

## 2024-04-19 RX ORDER — MELOXICAM 15 MG/1
15 TABLET ORAL
Qty: 30 | Refills: 2 | Status: ACTIVE | COMMUNITY
Start: 2024-02-16 | End: 1900-01-01

## 2024-04-25 ENCOUNTER — APPOINTMENT (OUTPATIENT)
Dept: PULMONOLOGY | Facility: CLINIC | Age: 74
End: 2024-04-25

## 2024-05-16 ENCOUNTER — APPOINTMENT (OUTPATIENT)
Dept: ORTHOPEDIC SURGERY | Facility: CLINIC | Age: 74
End: 2024-05-16
Payer: MEDICARE

## 2024-05-16 VITALS — HEIGHT: 60 IN | BODY MASS INDEX: 33.18 KG/M2 | WEIGHT: 169 LBS

## 2024-05-16 DIAGNOSIS — M17.11 UNILATERAL PRIMARY OSTEOARTHRITIS, RIGHT KNEE: ICD-10-CM

## 2024-05-16 PROCEDURE — 99214 OFFICE O/P EST MOD 30 MIN: CPT | Mod: 25

## 2024-05-16 PROCEDURE — 20610 DRAIN/INJ JOINT/BURSA W/O US: CPT | Mod: RT

## 2024-05-16 NOTE — PHYSICAL EXAM
[de-identified] : Patient is well nourished, well-developed, in no acute distress, with appropriate mood and affect. The patient is oriented to time, place, and person. Respirations are even and unlabored. Gait evaluation does reveal a limp. There is no inguinal adenopathy. Examination of the contralateral knee shows normal range of motion, strength, no tenderness, and intact skin. The affected limb is well-perfused, without skin lesions, shows a grossly normal motor and sensory examination. Knee motion is significantly reduced and does cause significant pain. The knee moves from 0 to 125 degrees. The knee is stable within that range-of-motion to AP and ML stress. The alignment of the knee is 5 degrees varus. Muscle strength is normal. Pedal pulses are palpable. Hip examination was negative.\par  \par

## 2024-05-16 NOTE — DISCUSSION/SUMMARY
[de-identified] : This patient has right knee osteoarthritis.  [facility] elects to return in 6 weeks to discuss TKA for October.  An extensive discussion was conducted on the natural history of the disease and the variety of surgical and non-surgical options available to the patient including, but not limited to non-steroidal anti-inflammatory medications, steroid injections, physical therapy, maintenance of ideal body weight, and reduction of activity.  Today we performed a right knee intra-articular cortisone injection.   The patient is also encouraged to trial a neoprene sleeve knee brace which can be purchased OTC.  The patient is also encouraged to consider use of a cane. The patient will schedule an appointment as needed.  Informed consent for the right knee injection was obtained. All questions were answered. A time out was performed. The right knee was prepped and draped in sterile fashion. Using sterile technique, the right knee was injected with 80mg of Kenalog, 4cc of 1% lidocaine, 4cc of 0.25% marcaine using a 21-gauge needle. A sterile dressing was applied. Post injection instructions were reviewed. The patient tolerated the procedure well.

## 2024-05-16 NOTE — HISTORY OF PRESENT ILLNESS
[de-identified] : This is very nice 73-year-old female experiencing right knee pain, which is severe in intensity.  She has known right knee osteoarthritis.  The pain substantially limits activities of daily living. Walking tolerance is reduced. Injections have worked well in the past. Mobic and PT helped.  The patient denies any radiation of the pain to the feet and it is not associated with numbness, tingling, or weakness.  Left total knee arthroplasty is feeling very well.

## 2024-06-07 ENCOUNTER — APPOINTMENT (OUTPATIENT)
Dept: PULMONOLOGY | Facility: CLINIC | Age: 74
End: 2024-06-07

## 2024-06-21 ENCOUNTER — APPOINTMENT (OUTPATIENT)
Dept: ENDOCRINOLOGY | Facility: CLINIC | Age: 74
End: 2024-06-21
Payer: MEDICARE

## 2024-06-21 VITALS
HEIGHT: 60 IN | SYSTOLIC BLOOD PRESSURE: 119 MMHG | HEART RATE: 68 BPM | OXYGEN SATURATION: 97 % | BODY MASS INDEX: 32.79 KG/M2 | DIASTOLIC BLOOD PRESSURE: 80 MMHG | WEIGHT: 167 LBS

## 2024-06-21 DIAGNOSIS — E66.9 OBESITY, UNSPECIFIED: ICD-10-CM

## 2024-06-21 DIAGNOSIS — E03.9 HYPOTHYROIDISM, UNSPECIFIED: ICD-10-CM

## 2024-06-21 PROCEDURE — 99214 OFFICE O/P EST MOD 30 MIN: CPT

## 2024-06-21 PROCEDURE — G2211 COMPLEX E/M VISIT ADD ON: CPT

## 2024-06-21 PROCEDURE — 36415 COLL VENOUS BLD VENIPUNCTURE: CPT

## 2024-06-21 RX ORDER — TIRZEPATIDE 2.5 MG/.5ML
2.5 INJECTION, SOLUTION SUBCUTANEOUS
Qty: 1 | Refills: 4 | Status: ACTIVE | COMMUNITY
Start: 2024-06-21 | End: 1900-01-01

## 2024-06-21 NOTE — HISTORY OF PRESENT ILLNESS
[FreeTextEntry1] : F/u for multiple medical issues  *** Jun 21, 2024 ***  still struggling with her weight started seeing Dr. Hoffman for her SAI remains on Synthroid 150 mcg no recent labs  *** Feb 29, 2024 ***  feels tired, lost a few lbs tried Mounjaro but stopped b/o stomach aches remains on Synthroid 150 mcg using CPAP  DXA(1/18/24)- LS (+1.3), LFN (+0.5), radius 33% (-1.0). FRAX- 6.6% and 0.5%  *** Nov 28, 2023 ***  stopped ozempic- had bloating, no weight loss staying on L-thyroxine 175 mcg qd no recent labs Home sleep study - c/w obstructive sleep apnea Saw Dr. Benavides - started on CPAP, but still not sleeping well and daytime hypersomnolence, dry mouth and occasional headaches in the morning  *** Aug 23, 2023 ***  feels well, except for occasional  nighttime neck discomfort. reports a few episodes of chocking sensation on solid food. saw GI 2 months- reports a normal work up snores. staying on L-thyroxine 175 mcg qd tried wegogy 0.25 mg sample with some weight loss, but the medication was denied  *** Apr 13, 2023 ***  s/p left TKR on 03/1/23, still undergoing PT struggles with the weight loss, feeling impact of extra weight on her joints remains on Synthroid 175 mcg  *** Jul 14, 2022 ***  feels more tired lately remains on Synthroid 200 mcg 6/week had a blood work with her GI on 06/30- TSH 6.56  *** Jan 28, 2022 ***  Labs from  1/14/22- TSH- 0.68, a1c- 5.8%  Thyroid US (1/14/22)- markedly atrophic gland. no nodules DXA (1/14/22)- FN (+0.8), LS (+1.6), radius 1/3 (-0.7)   *** Initial Televisit  Jan 06, 2022  (scheduled TELE because of the COVID exposure) ***   71 year female  referred for hypothyroidism management.  Ms. HARTMANN  was diagnosed with hypothyroidism more than 20 years ago. She was previously under care of Dr. Lucas. Most recently on Synthroid brand 200 mcg 6 days a week. She's been feeling more tired recently, with some neck discomfort. Denies family history of thyroid cancer or history of radiation exposure to head and neck area in a childhood. Her sister and a niece are presumably treated for an underactive thyroid.  She recalls her last DXA done more than 2 years ago,  and did not have a thyroid US done yet

## 2024-06-21 NOTE — ASSESSMENT
[FreeTextEntry1] : 1. Hypothyroidism - continue Synthroid 150 mcg, pending results. Continue via Synthroid Delivers - repeat thyroid functions today and readjust the dose if needed. - Proper intake of levothyroxine is reviewed in details, including on an empty stomach, with water only, at least one hour before taking any medications, vitamins, or supplements and three-four hours before taking iron or calcium.  2. Mild osteopenia - calcium 500 mg bid, add extra OTC vitamin D3 2,000 IU/day - continue weight-bearing exercises; advised avoiding high risk sports - DXA 3 sites in 1/26  3. Obesity with a severe SAI - Current approaches to weight management are discussed with the patient. Weight loss is crucial Suggested extensive nutritional education program. Proper dietary restrictions and exercise routines discussed. Medical weight loss therapies were reviewed with the patient. Success on Wegovy 0.25 mg qw samples, but intolerant of Ozempic. Patient wants to retry Zepbound- will rx. R+B reviewed.  f/u GI.  - f/u with Dr. Benavides for CPAP adjustment RTC 3 mos, depending on the labs.

## 2024-06-26 ENCOUNTER — NON-APPOINTMENT (OUTPATIENT)
Age: 74
End: 2024-06-26

## 2024-06-26 LAB
ESTIMATED AVERAGE GLUCOSE: 117 MG/DL
HBA1C MFR BLD HPLC: 5.7 %

## 2024-06-27 ENCOUNTER — NON-APPOINTMENT (OUTPATIENT)
Age: 74
End: 2024-06-27

## 2024-06-27 LAB
25(OH)D3 SERPL-MCNC: 33 NG/ML
ALBUMIN SERPL ELPH-MCNC: 4.7 G/DL
ALP BLD-CCNC: 82 U/L
ALT SERPL-CCNC: 31 U/L
ANION GAP SERPL CALC-SCNC: 14 MMOL/L
AST SERPL-CCNC: 24 U/L
BILIRUB SERPL-MCNC: 0.2 MG/DL
BUN SERPL-MCNC: 24 MG/DL
CALCIUM SERPL-MCNC: 9.8 MG/DL
CHLORIDE SERPL-SCNC: 99 MMOL/L
CO2 SERPL-SCNC: 25 MMOL/L
CREAT SERPL-MCNC: 0.79 MG/DL
EGFR: 78 ML/MIN/1.73M2
GLUCOSE SERPL-MCNC: 109 MG/DL
POTASSIUM SERPL-SCNC: 4.4 MMOL/L
PROT SERPL-MCNC: 7.1 G/DL
SODIUM SERPL-SCNC: 138 MMOL/L
T4 FREE SERPL-MCNC: 1.5 NG/DL
TSH SERPL-ACNC: 2.22 UIU/ML

## 2024-07-24 ENCOUNTER — RX RENEWAL (OUTPATIENT)
Age: 74
End: 2024-07-24

## 2024-08-08 ENCOUNTER — APPOINTMENT (OUTPATIENT)
Dept: ORTHOPEDIC SURGERY | Facility: CLINIC | Age: 74
End: 2024-08-08

## 2024-08-08 PROCEDURE — 73564 X-RAY EXAM KNEE 4 OR MORE: CPT | Mod: RT

## 2024-08-08 PROCEDURE — 20610 DRAIN/INJ JOINT/BURSA W/O US: CPT | Mod: RT

## 2024-08-08 PROCEDURE — 99215 OFFICE O/P EST HI 40 MIN: CPT | Mod: 25

## 2024-08-08 NOTE — PHYSICAL EXAM
[de-identified] : Well developed, well nourished in no apparent distress, awake, alert and orientated to person, place and time. with appropriate mood and affect.  Respirations are even and unlabored. Gait evaluation does reveal a limp. There is no inguinal adenopathy.  The affected limb is well-perfused, without skin lesions, shows a grossly normal motor and sensory examination.  Knee motion does cause pain. ROM of the knee is 0-120 degrees.  5 degrees varus The knee is stable within that range-of-motion to AP and ML stress.  Muscle strength is normal. Pedal pulses are palpable. [de-identified] : Long standing  knee, AP knee, lateral knee, tunnel and patellar views of the right knee were ordered and taken in the office and demonstrate degenerative joint disease of the knee with joint space narrowing, osteophyte formation, and subchondral sclerosis.

## 2024-08-08 NOTE — DISCUSSION/SUMMARY
[de-identified] : This patient has severe right knee osteoarthritis.  She has failed a course of conservative management and would like to proceed with right total knee arthroplasty using robotic navigation for assistance.   The patient is an appropriate candidate for consideration for total knee replacement. An extensive discussion was conducted of the natural history of the disease and the variety of surgical and non-surgical treatment options available to the patient. A risk/benefit analysis was discussed with the patient reviewing the advantages and disadvantages of surgical intervention at this time. Both the level and length of the patient's pain have made additional conservative treatment measures consisting of NSAIDs, physical therapy, corticosteroids, and/or viscosupplementation contraindicated. A full explanation was given of the nature and the purpose of the procedure and anesthesia, its benefits, possible alternative methods of diagnosis or treatment, the risks involved, the possibility of complications, the foreseeable consequences of the procedure and the possible results of the non-treatment. I reviewed the plan of care as well as used a model of a total knee implant equivalent to the one that will be used for their total knee joint replacement. The ability to secure the implant utilizing cement or cementless (press-fit) was discussed with the patient. The patient agrees with the plan of care, as well as the use of implants for their total knee replacement.   We also discussed that if robotic/computer navigation is utilized, then additional incisions may need to be made to accommodate the computer navigation arrays, which will be placed in the femur and tibia.   No guarantee or assurance was made as to the results that may be obtained. Specifically, the risks were identified to include, but are not limited to the following: Infection, phlebitis, pulmonary embolism, death, paralysis, dislocation, pain, stiffness, instability, limp, weakness, breakage, leg-length inequality, uncontrolled bleeding, nerve injury, blood vessel injury, pressure sores, anesthetic risks, delayed healing of wound and bone, and wear and loosening. Further discussion was undertaken with the patient about the details of surgical preparation, treatment, and postoperative rehabilitation including medical clearance, autotransfusion, the hospital course, and the postoperative rehabilitation involved. All in all, I feel that this patient is a good candidate for surgical reconstruction.   Informed consent for right knee injection  was obtained.  All risks, benefits and alternatives were discussed. These included but were not limited to bleeding, infection and allergic reaction. All questions were answered. A time out was performed. Right  knee were prepped and draped in sterile fashion. Using sterile technique, 40mg of Kenalog, 4cc of 1% lidocaine, 4cc of 0.25% marcaine using a 21-gauge needle. A sterile dressing was applied. Post injection instructions were reviewed. The patient tolerated the procedure well.  Meloxicam prescribed.

## 2024-08-08 NOTE — HISTORY OF PRESENT ILLNESS
[de-identified] : This is a very nice  73 year old  female experiencing worsening pain in the right knee which is severe in intensity and has been going on for at least 1 year now.  Treatment thus far has included cortisone injections and meloxicam.  The pain substantially limits activities of daily living. Walking tolerance is reduced. Medication including injections of cortisone and activity modification have been minimally effective for a period lasting greater than three months in duration. Assistive devices and external support were not deemed by the patient to be helpful in improving their function. Due to the severity of osteoarthritis and level of pain, physical therapy is contraindicated. Pain and restriction of function are intolerable at this time. The patient denies any radiation of the pain to the feet and it is not associated with numbness, tingling, or weakness.

## 2024-10-11 ENCOUNTER — APPOINTMENT (OUTPATIENT)
Dept: ENDOCRINOLOGY | Facility: CLINIC | Age: 74
End: 2024-10-11
Payer: MEDICARE

## 2024-10-11 VITALS
OXYGEN SATURATION: 100 % | RESPIRATION RATE: 16 BRPM | SYSTOLIC BLOOD PRESSURE: 144 MMHG | BODY MASS INDEX: 33.57 KG/M2 | HEART RATE: 59 BPM | DIASTOLIC BLOOD PRESSURE: 77 MMHG | HEIGHT: 60 IN | WEIGHT: 171 LBS

## 2024-10-11 DIAGNOSIS — E66.9 OBESITY, UNSPECIFIED: ICD-10-CM

## 2024-10-11 DIAGNOSIS — E03.9 HYPOTHYROIDISM, UNSPECIFIED: ICD-10-CM

## 2024-10-11 PROCEDURE — G2211 COMPLEX E/M VISIT ADD ON: CPT

## 2024-10-11 PROCEDURE — 99214 OFFICE O/P EST MOD 30 MIN: CPT

## 2024-10-11 PROCEDURE — 36415 COLL VENOUS BLD VENIPUNCTURE: CPT

## 2024-10-12 ENCOUNTER — NON-APPOINTMENT (OUTPATIENT)
Age: 74
End: 2024-10-12

## 2024-10-12 LAB
ALBUMIN SERPL ELPH-MCNC: 4.4 G/DL
ALP BLD-CCNC: 90 U/L
ALT SERPL-CCNC: 31 U/L
ANION GAP SERPL CALC-SCNC: 13 MMOL/L
AST SERPL-CCNC: 24 U/L
BILIRUB SERPL-MCNC: 0.3 MG/DL
BUN SERPL-MCNC: 12 MG/DL
CALCIUM SERPL-MCNC: 9.3 MG/DL
CHLORIDE SERPL-SCNC: 100 MMOL/L
CO2 SERPL-SCNC: 27 MMOL/L
CREAT SERPL-MCNC: 0.75 MG/DL
EGFR: 83 ML/MIN/1.73M2
ESTIMATED AVERAGE GLUCOSE: 120 MG/DL
GLUCOSE SERPL-MCNC: 101 MG/DL
HBA1C MFR BLD HPLC: 5.8 %
POTASSIUM SERPL-SCNC: 4.5 MMOL/L
PROT SERPL-MCNC: 6.7 G/DL
SODIUM SERPL-SCNC: 140 MMOL/L
T4 FREE SERPL-MCNC: 1.5 NG/DL
TSH SERPL-ACNC: 4.19 UIU/ML

## 2024-10-17 RX ORDER — SEMAGLUTIDE 0.5 MG/.5ML
0.5 INJECTION, SOLUTION SUBCUTANEOUS
Qty: 1 | Refills: 4 | Status: ACTIVE | COMMUNITY
Start: 2024-10-11

## 2024-10-31 ENCOUNTER — OUTPATIENT (OUTPATIENT)
Dept: OUTPATIENT SERVICES | Facility: HOSPITAL | Age: 74
LOS: 1 days | End: 2024-10-31
Payer: COMMERCIAL

## 2024-10-31 VITALS
OXYGEN SATURATION: 99 % | RESPIRATION RATE: 20 BRPM | WEIGHT: 169.98 LBS | TEMPERATURE: 98 F | SYSTOLIC BLOOD PRESSURE: 147 MMHG | HEIGHT: 59 IN | DIASTOLIC BLOOD PRESSURE: 87 MMHG | HEART RATE: 82 BPM

## 2024-10-31 DIAGNOSIS — G47.33 OBSTRUCTIVE SLEEP APNEA (ADULT) (PEDIATRIC): ICD-10-CM

## 2024-10-31 DIAGNOSIS — Z90.49 ACQUIRED ABSENCE OF OTHER SPECIFIED PARTS OF DIGESTIVE TRACT: Chronic | ICD-10-CM

## 2024-10-31 DIAGNOSIS — Z98.890 OTHER SPECIFIED POSTPROCEDURAL STATES: Chronic | ICD-10-CM

## 2024-10-31 DIAGNOSIS — Z87.39 PERSONAL HISTORY OF OTHER DISEASES OF THE MUSCULOSKELETAL SYSTEM AND CONNECTIVE TISSUE: ICD-10-CM

## 2024-10-31 DIAGNOSIS — M17.11 UNILATERAL PRIMARY OSTEOARTHRITIS, RIGHT KNEE: ICD-10-CM

## 2024-10-31 LAB
ANION GAP SERPL CALC-SCNC: 12 MMOL/L — SIGNIFICANT CHANGE UP (ref 5–17)
BUN SERPL-MCNC: 14 MG/DL — SIGNIFICANT CHANGE UP (ref 7–23)
CALCIUM SERPL-MCNC: 9.7 MG/DL — SIGNIFICANT CHANGE UP (ref 8.4–10.5)
CHLORIDE SERPL-SCNC: 98 MMOL/L — SIGNIFICANT CHANGE UP (ref 96–108)
CO2 SERPL-SCNC: 27 MMOL/L — SIGNIFICANT CHANGE UP (ref 22–31)
CREAT SERPL-MCNC: 0.73 MG/DL — SIGNIFICANT CHANGE UP (ref 0.5–1.3)
EGFR: 85 ML/MIN/1.73M2 — SIGNIFICANT CHANGE UP
GLUCOSE SERPL-MCNC: 95 MG/DL — SIGNIFICANT CHANGE UP (ref 70–99)
HCT VFR BLD CALC: 39.9 % — SIGNIFICANT CHANGE UP (ref 34.5–45)
HGB BLD-MCNC: 13.5 G/DL — SIGNIFICANT CHANGE UP (ref 11.5–15.5)
MCHC RBC-ENTMCNC: 29.2 PG — SIGNIFICANT CHANGE UP (ref 27–34)
MCHC RBC-ENTMCNC: 33.8 G/DL — SIGNIFICANT CHANGE UP (ref 32–36)
MCV RBC AUTO: 86.4 FL — SIGNIFICANT CHANGE UP (ref 80–100)
MRSA PCR RESULT.: SIGNIFICANT CHANGE UP
NRBC # BLD: 0 /100 WBCS — SIGNIFICANT CHANGE UP (ref 0–0)
PLATELET # BLD AUTO: 338 K/UL — SIGNIFICANT CHANGE UP (ref 150–400)
POTASSIUM SERPL-MCNC: 4.1 MMOL/L — SIGNIFICANT CHANGE UP (ref 3.5–5.3)
POTASSIUM SERPL-SCNC: 4.1 MMOL/L — SIGNIFICANT CHANGE UP (ref 3.5–5.3)
RBC # BLD: 4.62 M/UL — SIGNIFICANT CHANGE UP (ref 3.8–5.2)
RBC # FLD: 13.3 % — SIGNIFICANT CHANGE UP (ref 10.3–14.5)
S AUREUS DNA NOSE QL NAA+PROBE: SIGNIFICANT CHANGE UP
SODIUM SERPL-SCNC: 137 MMOL/L — SIGNIFICANT CHANGE UP (ref 135–145)
WBC # BLD: 5.6 K/UL — SIGNIFICANT CHANGE UP (ref 3.8–10.5)
WBC # FLD AUTO: 5.6 K/UL — SIGNIFICANT CHANGE UP (ref 3.8–10.5)

## 2024-10-31 PROCEDURE — G0463: CPT

## 2024-10-31 PROCEDURE — 87641 MR-STAPH DNA AMP PROBE: CPT

## 2024-10-31 PROCEDURE — 80048 BASIC METABOLIC PNL TOTAL CA: CPT

## 2024-10-31 PROCEDURE — 73700 CT LOWER EXTREMITY W/O DYE: CPT | Mod: MC

## 2024-10-31 PROCEDURE — 87640 STAPH A DNA AMP PROBE: CPT

## 2024-10-31 PROCEDURE — 73700 CT LOWER EXTREMITY W/O DYE: CPT | Mod: 26,RT,MC

## 2024-10-31 PROCEDURE — 85027 COMPLETE CBC AUTOMATED: CPT

## 2024-10-31 RX ORDER — LIDOCAINE HCL 20 MG/ML
0.2 VIAL (ML) INJECTION ONCE
Refills: 0 | Status: DISCONTINUED | OUTPATIENT
Start: 2024-11-20 | End: 2024-11-20

## 2024-10-31 RX ORDER — SODIUM CHLORIDE 9 MG/ML
3 INJECTION, SOLUTION INTRAMUSCULAR; INTRAVENOUS; SUBCUTANEOUS EVERY 8 HOURS
Refills: 0 | Status: DISCONTINUED | OUTPATIENT
Start: 2024-11-20 | End: 2024-11-20

## 2024-10-31 RX ORDER — CHLORHEXIDINE GLUCONATE 1.2 MG/ML
1 RINSE ORAL ONCE
Refills: 0 | Status: DISCONTINUED | OUTPATIENT
Start: 2024-11-20 | End: 2024-11-20

## 2024-10-31 NOTE — H&P PST ADULT - HISTORY OF PRESENT ILLNESS
74 year old female with h/o HTN, SAI (CPAP), hypothyroid, acid reflux, obesity, PCN allergy (angioedema, fainting), osteoarthritis, presents to PST for scheduled right total knee arthroplasty with Pee Robot on 11/20/2024.     ****On Wegovy, last dose on 11/8/2024

## 2024-10-31 NOTE — H&P PST ADULT - NSICDXPASTMEDICALHX_GEN_ALL_CORE_FT
PAST MEDICAL HISTORY:  2019 novel coronavirus disease (COVID-19)     Acid reflux     HTN (hypertension)     Hypothyroid     SAI on CPAP     Osteoarthritis

## 2024-10-31 NOTE — H&P PST ADULT - ASSESSMENT
CAPRINI SCORE    AGE RELATED RISK FACTORS                                                             [ ] Age 41-60 years                                            (1 Point)  [2 ] Age: 61-74 years                                           (2 Points)                 [ ] Age= 75 years                                                (3 Points)             DISEASE RELATED RISK FACTORS                                                       [ ] Edema in the lower extremities                 (1 Point)                     [ ] Varicose veins                                               (1 Point)                                 [ 1] BMI > 25 Kg/m2                                            (1 Point)                                  [ ] Serious infection (ie PNA)                            (1 Point)                     [ ] Lung disease ( COPD, Emphysema)            (1 Point)                                                                          [ ] Acute myocardial infarction                         (1 Point)                  [ ] Congestive heart failure (in the previous month)  (1 Point)         [ ] Inflammatory bowel disease                            (1 Point)                  [ ] Central venous access, PICC or Port               (2 points)       (within the last month)                                                                [ ] Stroke (in the previous month)                        (5 Points)    [ ] Previous or present malignancy                       (2 points)                                                                                                                                                         HEMATOLOGY RELATED FACTORS                                                         [ ] Prior episodes of VTE                                     (3 Points)                     [ ] Positive family history for VTE                      (3 Points)                  [ ] Prothrombin 81401 A                                     (3 Points)                     [ ] Factor V Leiden                                                (3 Points)                        [ ] Lupus anticoagulants                                      (3 Points)                                                           [ ] Anticardiolipin antibodies                              (3 Points)                                                       [ ] High homocysteine in the blood                   (3 Points)                                             [ ] Other congenital or acquired thrombophilia      (3 Points)                                                [ ] Heparin induced thrombocytopenia                  (3 Points)                                        MOBILITY RELATED FACTORS  [ ] Bed rest                                                         (1 Point)  [ ] Plaster cast                                                    (2 points)  [ ] Bed bound for more than 72 hours           (2 Points)    GENDER SPECIFIC FACTORS  [ ] Pregnancy or had a baby within the last month   (1 Point)  [ ] Post-partum < 6 weeks                                   (1 Point)  [ ] Hormonal therapy  or oral contraception   (1 Point)  [ ] History of pregnancy complications              (1 point)  [ ] Unexplained or recurrent              (1 Point)    OTHER RISK FACTORS                                           (1 Point)  [ ] BMI >40, smoking, diabetes requiring insulin, chemotherapy  blood transfusions and length of surgery over 2 hours    SURGERY RELATED RISK FACTORS  [ ]  Section within the last month     (1 Point)  [ ] Minor surgery                                                  (1 Point)  [ ] Arthroscopic surgery                                       (2 Points)  [ ] Planned major surgery lasting more            (2 Points)      than 45 minutes     [5 ] Elective hip or knee joint replacement       (5 points)       surgery                                                TRAUMA RELATED RISK FACTORS  [ ] Fracture of the hip, pelvis, or leg                       (5 Points)  [ ] Spinal cord injury resulting in paralysis             (5 points)       (in the previous month)    [ ] Paralysis  (less than 1 month)                             (5 Points)  [ ] Multiple Trauma within 1 month                        (5 Points)    Total Score [8]    Caprini Score 0-2: Low Risk, NO VTE prophylaxis required for most patients, encourage ambulation  Caprini Score 3-6: Moderate Risk , pharmacologic VTE prophylaxis is indicated for most patients (in the absence of contraindications)  Caprini Score Greater than or =7: High risk, pharmocologic VTE prophylaxis indicated for most patients (in the absence of contraindications)      Dasi activities: does some house chores, able to climb 1-2 flights of stair slowly due to knee pain   Dasi score: 5.72  pt denies any loose or removable teeth

## 2024-10-31 NOTE — H&P PST ADULT - PROBLEM SELECTOR PLAN 1
scheduled for right total knee arthroplasty with Pee robot  preop instruction provided an pt verbalized understanding   A1c result on file   labs done at pst (cbc, bmp, mrsa) result pending

## 2024-10-31 NOTE — H&P PST ADULT - REASON FOR ADMISSION
"Knee replacement, right"  Goal: "alleviate the pain." "Knee replacement, right"  Goal of care: "alleviate the pain."

## 2024-11-15 PROBLEM — G47.33 OBSTRUCTIVE SLEEP APNEA (ADULT) (PEDIATRIC): Chronic | Status: ACTIVE | Noted: 2024-10-31

## 2024-11-20 ENCOUNTER — TRANSCRIPTION ENCOUNTER (OUTPATIENT)
Age: 74
End: 2024-11-20

## 2024-11-20 ENCOUNTER — INPATIENT (INPATIENT)
Facility: HOSPITAL | Age: 74
LOS: 0 days | Discharge: HOME HEALTH | DRG: 470 | End: 2024-11-21
Attending: ORTHOPAEDIC SURGERY | Admitting: ORTHOPAEDIC SURGERY
Payer: COMMERCIAL

## 2024-11-20 ENCOUNTER — APPOINTMENT (OUTPATIENT)
Dept: ORTHOPEDIC SURGERY | Facility: HOSPITAL | Age: 74
End: 2024-11-20

## 2024-11-20 VITALS
HEIGHT: 58.9 IN | OXYGEN SATURATION: 99 % | TEMPERATURE: 98 F | HEART RATE: 110 BPM | WEIGHT: 169.98 LBS | SYSTOLIC BLOOD PRESSURE: 157 MMHG | DIASTOLIC BLOOD PRESSURE: 91 MMHG | RESPIRATION RATE: 18 BRPM

## 2024-11-20 DIAGNOSIS — Z98.890 OTHER SPECIFIED POSTPROCEDURAL STATES: Chronic | ICD-10-CM

## 2024-11-20 DIAGNOSIS — Z90.49 ACQUIRED ABSENCE OF OTHER SPECIFIED PARTS OF DIGESTIVE TRACT: Chronic | ICD-10-CM

## 2024-11-20 DIAGNOSIS — M17.11 UNILATERAL PRIMARY OSTEOARTHRITIS, RIGHT KNEE: ICD-10-CM

## 2024-11-20 PROCEDURE — 20985 CPTR-ASST DIR MS PX: CPT

## 2024-11-20 PROCEDURE — 73560 X-RAY EXAM OF KNEE 1 OR 2: CPT | Mod: 26,RT

## 2024-11-20 PROCEDURE — 27447 TOTAL KNEE ARTHROPLASTY: CPT | Mod: RT

## 2024-11-20 DEVICE — COMP FEM CR CMNTLSS SZ 1 RT: Type: IMPLANTABLE DEVICE | Site: RIGHT | Status: FUNCTIONAL

## 2024-11-20 DEVICE — INSERT TIB BEARING CS X3 SZ 1 10MM: Type: IMPLANTABLE DEVICE | Site: RIGHT | Status: FUNCTIONAL

## 2024-11-20 DEVICE — BASEPLATE TIB TRIATHLON TRITAN SZ 1: Type: IMPLANTABLE DEVICE | Site: RIGHT | Status: FUNCTIONAL

## 2024-11-20 DEVICE — IMP PATELLA SYMMETRIC X3 27X8MM: Type: IMPLANTABLE DEVICE | Site: RIGHT | Status: FUNCTIONAL

## 2024-11-20 DEVICE — CEMENT SIMPLEX P 40GM: Type: IMPLANTABLE DEVICE | Site: RIGHT | Status: FUNCTIONAL

## 2024-11-20 DEVICE — MAKO BONE PIN 4MM X 110MM: Type: IMPLANTABLE DEVICE | Site: RIGHT | Status: FUNCTIONAL

## 2024-11-20 DEVICE — MAKO BONE PIN 4MM X 140MM: Type: IMPLANTABLE DEVICE | Site: RIGHT | Status: FUNCTIONAL

## 2024-11-20 RX ORDER — OXYCODONE HYDROCHLORIDE 30 MG/1
10 TABLET ORAL EVERY 4 HOURS
Refills: 0 | Status: DISCONTINUED | OUTPATIENT
Start: 2024-11-20 | End: 2024-11-21

## 2024-11-20 RX ORDER — LATANOPROST 50 UG/ML
1 SOLUTION/ DROPS OPHTHALMIC
Refills: 0 | DISCHARGE

## 2024-11-20 RX ORDER — ONDANSETRON HYDROCHLORIDE 4 MG/1
4 TABLET, FILM COATED ORAL EVERY 6 HOURS
Refills: 0 | Status: DISCONTINUED | OUTPATIENT
Start: 2024-11-20 | End: 2024-11-21

## 2024-11-20 RX ORDER — PANTOPRAZOLE SODIUM 40 MG/1
40 TABLET, DELAYED RELEASE ORAL
Refills: 0 | Status: DISCONTINUED | OUTPATIENT
Start: 2024-11-20 | End: 2024-11-21

## 2024-11-20 RX ORDER — SEMAGLUTIDE 1.34 MG/ML
0.25 INJECTION, SOLUTION SUBCUTANEOUS
Refills: 0 | DISCHARGE

## 2024-11-20 RX ORDER — ACETAMINOPHEN 500MG 500 MG/1
1000 TABLET, COATED ORAL ONCE
Refills: 0 | Status: COMPLETED | OUTPATIENT
Start: 2024-11-21 | End: 2024-11-21

## 2024-11-20 RX ORDER — CEFAZOLIN SODIUM 10 G
2000 VIAL (EA) INJECTION EVERY 8 HOURS
Refills: 0 | Status: COMPLETED | OUTPATIENT
Start: 2024-11-20 | End: 2024-11-21

## 2024-11-20 RX ORDER — ACETAMINOPHEN 500MG 500 MG/1
1000 TABLET, COATED ORAL ONCE
Refills: 0 | Status: COMPLETED | OUTPATIENT
Start: 2024-11-20 | End: 2024-11-20

## 2024-11-20 RX ORDER — LOSARTAN POTASSIUM 100 MG/1
100 TABLET, FILM COATED ORAL DAILY
Refills: 0 | Status: DISCONTINUED | OUTPATIENT
Start: 2024-11-21 | End: 2024-11-21

## 2024-11-20 RX ORDER — SODIUM CHLORIDE 9 MG/ML
500 INJECTION, SOLUTION INTRAMUSCULAR; INTRAVENOUS; SUBCUTANEOUS ONCE
Refills: 0 | Status: COMPLETED | OUTPATIENT
Start: 2024-11-20 | End: 2024-11-20

## 2024-11-20 RX ORDER — LATANOPROST 50 UG/ML
1 SOLUTION/ DROPS OPHTHALMIC AT BEDTIME
Refills: 0 | Status: DISCONTINUED | OUTPATIENT
Start: 2024-11-20 | End: 2024-11-21

## 2024-11-20 RX ORDER — TRAMADOL HYDROCHLORIDE 300 MG/1
50 CAPSULE ORAL EVERY 6 HOURS
Refills: 0 | Status: DISCONTINUED | OUTPATIENT
Start: 2024-11-20 | End: 2024-11-21

## 2024-11-20 RX ORDER — AZELASTINE HYDROCHLORIDE 137 UG/1
1 SPRAY, METERED NASAL
Refills: 0 | DISCHARGE

## 2024-11-20 RX ORDER — OXYCODONE HYDROCHLORIDE 30 MG/1
5 TABLET ORAL EVERY 4 HOURS
Refills: 0 | Status: DISCONTINUED | OUTPATIENT
Start: 2024-11-20 | End: 2024-11-21

## 2024-11-20 RX ORDER — KETOROLAC TROMETHAMINE 30 MG/ML
15 INJECTION INTRAMUSCULAR; INTRAVENOUS EVERY 6 HOURS
Refills: 0 | Status: DISCONTINUED | OUTPATIENT
Start: 2024-11-20 | End: 2024-11-21

## 2024-11-20 RX ORDER — SODIUM CHLORIDE 9 MG/ML
500 INJECTION, SOLUTION INTRAMUSCULAR; INTRAVENOUS; SUBCUTANEOUS ONCE
Refills: 0 | Status: COMPLETED | OUTPATIENT
Start: 2024-11-20 | End: 2024-11-21

## 2024-11-20 RX ORDER — HYDROMORPHONE HYDROCHLORIDE 2 MG/1
0.5 TABLET ORAL
Refills: 0 | Status: DISCONTINUED | OUTPATIENT
Start: 2024-11-20 | End: 2024-11-20

## 2024-11-20 RX ORDER — DEXAMETHASONE 1.5 MG/1
8 TABLET ORAL ONCE
Refills: 0 | Status: COMPLETED | OUTPATIENT
Start: 2024-11-21 | End: 2024-11-21

## 2024-11-20 RX ORDER — LEVOTHYROXINE SODIUM 88 MCG
1 TABLET ORAL
Refills: 0 | DISCHARGE

## 2024-11-20 RX ORDER — LOSARTAN POTASSIUM 25 MG/1
1 TABLET ORAL
Refills: 0 | DISCHARGE

## 2024-11-20 RX ORDER — CYANOCOBALAMIN/FOLIC AC/VIT B6 1-2.2-25MG
1 TABLET ORAL DAILY
Refills: 0 | Status: DISCONTINUED | OUTPATIENT
Start: 2024-11-20 | End: 2024-11-21

## 2024-11-20 RX ORDER — SUCRALFATE 1 G/1
1 TABLET ORAL
Refills: 0 | Status: DISCONTINUED | OUTPATIENT
Start: 2024-11-21 | End: 2024-11-21

## 2024-11-20 RX ORDER — HYDROCHLOROTHIAZIDE 50 MG
1 TABLET ORAL
Refills: 0 | DISCHARGE

## 2024-11-20 RX ORDER — CELECOXIB 200 MG/1
200 CAPSULE ORAL EVERY 12 HOURS
Refills: 0 | Status: DISCONTINUED | OUTPATIENT
Start: 2024-11-22 | End: 2024-11-21

## 2024-11-20 RX ORDER — AMLODIPINE BESYLATE 10 MG/1
5 TABLET ORAL DAILY
Refills: 0 | Status: DISCONTINUED | OUTPATIENT
Start: 2024-11-20 | End: 2024-11-21

## 2024-11-20 RX ORDER — SENNOSIDES 8.6 MG
2 TABLET ORAL AT BEDTIME
Refills: 0 | Status: DISCONTINUED | OUTPATIENT
Start: 2024-11-20 | End: 2024-11-21

## 2024-11-20 RX ORDER — CEFAZOLIN SODIUM 10 G
2000 VIAL (EA) INJECTION ONCE
Refills: 0 | Status: COMPLETED | OUTPATIENT
Start: 2024-11-20 | End: 2024-11-20

## 2024-11-20 RX ORDER — LEVOTHYROXINE SODIUM 150 MCG
150 TABLET ORAL DAILY
Refills: 0 | Status: DISCONTINUED | OUTPATIENT
Start: 2024-11-20 | End: 2024-11-21

## 2024-11-20 RX ORDER — ACETAMINOPHEN 500MG 500 MG/1
1000 TABLET, COATED ORAL EVERY 8 HOURS
Refills: 0 | Status: DISCONTINUED | OUTPATIENT
Start: 2024-11-21 | End: 2024-11-21

## 2024-11-20 RX ORDER — AMLODIPINE BESYLATE 10 MG
1 TABLET ORAL
Refills: 0 | DISCHARGE

## 2024-11-20 RX ORDER — POLYETHYLENE GLYCOL 3350 17 G/17G
17 POWDER, FOR SOLUTION ORAL AT BEDTIME
Refills: 0 | Status: DISCONTINUED | OUTPATIENT
Start: 2024-11-20 | End: 2024-11-21

## 2024-11-20 RX ORDER — HYDROMORPHONE HYDROCHLORIDE 2 MG/1
0.25 TABLET ORAL
Refills: 0 | Status: DISCONTINUED | OUTPATIENT
Start: 2024-11-20 | End: 2024-11-20

## 2024-11-20 RX ORDER — SUCRALFATE 1 G/10ML
1 SUSPENSION ORAL
Refills: 0 | DISCHARGE

## 2024-11-20 RX ORDER — 0.9 % SODIUM CHLORIDE 0.9 %
1000 INTRAVENOUS SOLUTION INTRAVENOUS
Refills: 0 | Status: DISCONTINUED | OUTPATIENT
Start: 2024-11-20 | End: 2024-11-20

## 2024-11-20 RX ORDER — ONDANSETRON HYDROCHLORIDE 4 MG/1
4 TABLET, FILM COATED ORAL ONCE
Refills: 0 | Status: COMPLETED | OUTPATIENT
Start: 2024-11-20 | End: 2024-11-20

## 2024-11-20 RX ADMIN — OXYCODONE HYDROCHLORIDE 10 MILLIGRAM(S): 30 TABLET ORAL at 22:40

## 2024-11-20 RX ADMIN — HYDROMORPHONE HYDROCHLORIDE 0.5 MILLIGRAM(S): 2 TABLET ORAL at 14:40

## 2024-11-20 RX ADMIN — OXYCODONE HYDROCHLORIDE 10 MILLIGRAM(S): 30 TABLET ORAL at 18:32

## 2024-11-20 RX ADMIN — HYDROMORPHONE HYDROCHLORIDE 0.5 MILLIGRAM(S): 2 TABLET ORAL at 15:00

## 2024-11-20 RX ADMIN — OXYCODONE HYDROCHLORIDE 10 MILLIGRAM(S): 30 TABLET ORAL at 19:35

## 2024-11-20 RX ADMIN — KETOROLAC TROMETHAMINE 15 MILLIGRAM(S): 30 INJECTION INTRAMUSCULAR; INTRAVENOUS at 23:38

## 2024-11-20 RX ADMIN — ACETAMINOPHEN 500MG 400 MILLIGRAM(S): 500 TABLET, COATED ORAL at 21:31

## 2024-11-20 RX ADMIN — HYDROMORPHONE HYDROCHLORIDE 0.5 MILLIGRAM(S): 2 TABLET ORAL at 14:25

## 2024-11-20 RX ADMIN — KETOROLAC TROMETHAMINE 15 MILLIGRAM(S): 30 INJECTION INTRAMUSCULAR; INTRAVENOUS at 17:41

## 2024-11-20 RX ADMIN — HYDROMORPHONE HYDROCHLORIDE 0.5 MILLIGRAM(S): 2 TABLET ORAL at 15:30

## 2024-11-20 RX ADMIN — HYDROMORPHONE HYDROCHLORIDE 0.5 MILLIGRAM(S): 2 TABLET ORAL at 15:15

## 2024-11-20 RX ADMIN — OXYCODONE HYDROCHLORIDE 10 MILLIGRAM(S): 30 TABLET ORAL at 23:20

## 2024-11-20 RX ADMIN — LATANOPROST 1 DROP(S): 50 SOLUTION/ DROPS OPHTHALMIC at 21:33

## 2024-11-20 RX ADMIN — Medication 100 MILLIGRAM(S): at 21:33

## 2024-11-20 RX ADMIN — ONDANSETRON HYDROCHLORIDE 4 MILLIGRAM(S): 4 TABLET, FILM COATED ORAL at 16:29

## 2024-11-20 RX ADMIN — SODIUM CHLORIDE 500 MILLILITER(S): 9 INJECTION, SOLUTION INTRAMUSCULAR; INTRAVENOUS; SUBCUTANEOUS at 15:11

## 2024-11-20 RX ADMIN — ACETAMINOPHEN 500MG 1000 MILLIGRAM(S): 500 TABLET, COATED ORAL at 22:15

## 2024-11-20 RX ADMIN — Medication 81 MILLIGRAM(S): at 17:14

## 2024-11-20 RX ADMIN — SODIUM CHLORIDE 500 MILLILITER(S): 9 INJECTION, SOLUTION INTRAMUSCULAR; INTRAVENOUS; SUBCUTANEOUS at 18:35

## 2024-11-20 NOTE — PRE-OP CHECKLIST - SIDE RAILS UP
How Severe Are Your Spot(S)?: moderate
What Type Of Note Output Would You Prefer (Optional)?: Standard Output
What Is The Reason For Today's Visit?: Full Body Skin Examination
What Is The Reason For Today's Visit? (Being Monitored For X): concerning skin lesions on a periodic basis
done

## 2024-11-20 NOTE — PATIENT PROFILE ADULT - FALL HARM RISK - UNIVERSAL INTERVENTIONS
Bed in lowest position, wheels locked, appropriate side rails in place/Call bell, personal items and telephone in reach/Instruct patient to call for assistance before getting out of bed or chair/Non-slip footwear when patient is out of bed/[place] to call system/Physically safe environment - no spills, clutter or unnecessary equipment/Purposeful Proactive Rounding/Room/bathroom lighting operational, light cord in reach

## 2024-11-20 NOTE — DISCHARGE NOTE PROVIDER - NSDCFUADDINST_GEN_ALL_CORE_FT
Please follow up with Dr. Ghosh at your scheduled follow up appointment in 2 weeks (Call office to confirm appointment).  PT-weight bearing as tolerated.  Aspirin 81mg twice daily x 4 weeks total for dvt prevention.  Keep dressing clean, dry and intact until date listed on dressing.  Have doctor remove any sutures (if applicable) at follow up visit.      Please follow up with your PMD within 1 month for routine checkup.  Please follow up with Dr. Ghosh at your scheduled follow up appointment on 12/4/24- please call to confirm appointment.    PT-weight bearing as tolerated on right leg.    Continue aspirin 81mg twice daily x 4 weeks total for blood clot prevention.    Shower with assistance. No direct water to dressing. No tub baths. Blot dressing dry gently, no rubbing dry.   Keep dressing clean, dry and intact until date listed on dressing.  Have doctor remove any sutures (if applicable) at follow up visit.      Please follow up with your PMD within 1 month for routine checkup.  Please follow up with Dr. Ghosh at your scheduled follow up appointment on 12/4/24- please call to confirm appointment.    PT-weight bearing as tolerated on right leg.    Continue aspirin 81mg twice daily x 4 weeks total for blood clot prevention.    Shower with assistance. No direct water to dressing. No tub baths. Blot dressing dry gently, no rubbing dry.   Keep dressing clean, dry and intact until date listed on dressing.  Have doctor remove any sutures (if applicable) at follow up visit.    Please follow up with your PMD within 1 month for routine checkup.  Please follow up with Dr. Ghosh at your scheduled follow up appointment on 12/4/24- please call to confirm appointment.    Please call Dr. Ghosh's office if have persistent nausea or vomiting with narcotic(s) intake.   PT-weight bearing as tolerated on right leg.    Continue aspirin 81mg twice daily x 4 weeks total for blood clot prevention.    Shower with assistance. No direct water to dressing. No tub baths. Blot dressing dry gently, no rubbing dry.   Keep dressing clean, dry and intact until date listed on dressing.  Have doctor remove any sutures (if applicable) at follow up visit.    Please follow up with your PMD within 1 month for routine checkup.

## 2024-11-20 NOTE — DISCHARGE NOTE PROVIDER - NSDCCPTREATMENT_GEN_ALL_CORE_FT
PRINCIPAL PROCEDURE  Procedure: Total knee replacement  Findings and Treatment: primary osteoarthritis

## 2024-11-20 NOTE — PATIENT PROFILE ADULT - NSPROSPHOSPCHAPLAINYN_GEN_A_NUR
Activity and Diet restrictions:   Recommend 2-3 gram sodium restriction and 1500cc- 2000cc fluid restriction.  Call clinic for increased shortness of breath, or increased swelling.  Weigh yourself every day and call the office if your weight increases by more than 3 lbs in 1 day or 5 lbs in 3 days.     Return for follow up labs next week.       Follow up with general cardiology next week.    Continue lasix 40mg twice a day    Today refill potassium at pharmacy. Take 40meq today then continue with 20meq tablets daily.   no

## 2024-11-20 NOTE — DISCHARGE NOTE PROVIDER - NSDCMRMEDTOKEN_GEN_ALL_CORE_FT
amLODIPine 5 mg oral tablet: 1 tab(s) orally once a day  azelastine 137 mcg/inh (0.1%) nasal spray: 1 spray(s) in each nostril once a day  hydroCHLOROthiazide 25 mg oral tablet: 1 tab(s) orally once a day  losartan 100 mg oral tablet: 1 tab(s) orally once a day  meloxicam 15 mg oral tablet: 1 tab(s) orally prn as needed for pain  pantoprazole 40 mg oral delayed release tablet: 1 tab(s) orally once a day MDD:1  sucralfate 1 g oral tablet: 1 tab(s) orally once a day  Synthroid 150 mcg (0.15 mg) oral tablet: 1 tab(s) orally once a day  vitamin D3  1000 IU daily:   Wegovy (0.25 mg dose) subcutaneous solution: 0.25 milligram(s) subcutaneously once a week on Friday   amLODIPine 5 mg oral tablet: 1 tab(s) orally once a day  azelastine 137 mcg/inh (0.1%) nasal spray: 1 spray(s) in each nostril once a day  hydroCHLOROthiazide 25 mg oral tablet: 1 tab(s) orally once a day  latanoprost 0.005% ophthalmic solution: 1 drop(s) in each eye once a day (at bedtime)  losartan 100 mg oral tablet: 1 tab(s) orally once a day  meloxicam 15 mg oral tablet: 1 tab(s) orally prn as needed for pain  pantoprazole 40 mg oral delayed release tablet: 1 tab(s) orally once a day MDD:1  sucralfate 1 g oral tablet: 1 tab(s) orally once a day  Synthroid 150 mcg (0.15 mg) oral tablet: 1 tab(s) orally once a day  vitamin D3  1000 IU daily:   Wegovy (0.25 mg dose) subcutaneous solution: 0.25 milligram(s) subcutaneously once a week on Friday   acetaminophen 500 mg oral tablet: 2 tab(s) orally every 6 hours X 6 days, then as needed for mild pain MDD: 008  amLODIPine 5 mg oral tablet: 1 tab(s) orally once a day  aspirin 81 mg oral delayed release tablet: 1 tab(s) orally 2 times a day X 30 days for blood clot prevention MDD: 002  azelastine 137 mcg/inh (0.1%) nasal spray: 1 spray(s) in each nostril once a day  hydroCHLOROthiazide 25 mg oral tablet: 1 tab(s) orally once a day  latanoprost 0.005% ophthalmic solution: 1 drop(s) in each eye once a day (at bedtime)  losartan 100 mg oral tablet: 1 tab(s) orally once a day  naproxen 500 mg oral tablet: 1 tab(s) orally 2 times a day X 2 weeks for pain control MDD: 002  Narcan 4 mg/0.1 mL nasal spray: 1 spray(s) intranasally every 2 minutes alternating between nostrils in event of narcotic overdose  oxyCODONE 5 mg oral tablet: 0.5 tab(s) orally every 4 hours as needed for  moderate pain ; 1 Tab PO Q4H PRN Severe Pain MDD: 006  pantoprazole 40 mg oral delayed release tablet: 1 tab(s) orally once a day before breakfast for stomach protection MDD: 1  polyethylene glycol 3350 oral powder for reconstitution: 17 gram(s) orally once a day (at bedtime) as needed for  constipation (over the counter)  senna leaf extract oral tablet: 2 tab(s) orally once a day (at bedtime) to prevent constipation. Stop if having diarrhea or loose stools MDD: 002  sucralfate 1 g oral tablet: 1 tab(s) orally once a day  Synthroid 150 mcg (0.15 mg) oral tablet: 1 tab(s) orally once a day  traMADol 50 mg oral tablet: 1 tab(s) orally every 6 hours as needed for Mild Pain (1 - 3) (Take with Tylenol 1000mg PO Q6H PRN for pain) MDD: 004  vitamin D3  1000 IU daily:   Wegovy (0.25 mg dose) subcutaneous solution: 0.25 milligram(s) subcutaneously once a week on Friday

## 2024-11-20 NOTE — PHYSICAL THERAPY INITIAL EVALUATION ADULT - PERTINENT HX OF CURRENT PROBLEM, REHAB EVAL
74 y.o. F PMH HTN, SAI (CPAP), hypothyroid, acid reflux, obesity, PCN allergy (angioedema, fainting), osteoarthritis. Now s/p R total knee replacement on 11/20/24.

## 2024-11-20 NOTE — PHYSICAL THERAPY INITIAL EVALUATION ADULT - ADDITIONAL COMMENTS
Pt resides in a pvt home with family, no steps to enter from the back door, 6+6 steps to negotiate inside (+HR). PTA pt was independent with all mobility & ADL's. Did not use an AD for ambulation, owns RW.

## 2024-11-20 NOTE — DISCHARGE NOTE PROVIDER - NSDCFUSCHEDAPPT_GEN_ALL_CORE_FT
Luis Ghosh  [facility]  ORTHOSURG [address]  Scheduled Appointment: 11/20/2024    [facility]  ORTHOSURG [address]   Scheduled Appointment: 12/04/2024    Saima Barger  [facility]  ENDOCRIN [address]  Scheduled Appointment: 02/14/2025     [facility]  ORTHOSURG [address] [facility] [address]  Scheduled Appointment: 12/04/2024    Saima Barger  [facility]  ENDOCRIN [address]   Scheduled Appointment: 02/14/2025

## 2024-11-20 NOTE — DISCHARGE NOTE PROVIDER - HOSPITAL COURSE
Reason for Admission	"Knee replacement, right"  Goal of care: "alleviate the pain."      This is a 74 year old female admitted to [facility] on 11/20/24 for an elective total knee arthroplasty.  Surgery was uncomplicated.  Patient evaluated and ctreated by PT, recommended for home.  Remain of hospital stay unremarkable, and patient discharged home when PT cleared. Reason for Admission	"Knee replacement, right"  Goal of care: "alleviate the pain."      This is a 74 year old female admitted to [facility] on 11/20/24 for an elective right total knee arthroplasty.  Surgery was uncomplicated.  Patient evaluated and ctreated by PT, recommended for home.  Remain of hospital stay unremarkable, and patient discharged home when PT cleared.

## 2024-11-20 NOTE — PHYSICAL THERAPY INITIAL EVALUATION ADULT - REFERRING PHYSICIAN, REHAB EVAL
----- Message from Serena Carreno LPN sent at 3/26/8626 11:12 AM EDT -----  Regarding: FW: return call  Contact: [phone]      ----- Message -----     From: Moe Barajas     Sent: 4/25/2018  11:05 AM       To: [facility]  Subject: return call                                      Patients mother was returning a call from Jordan Monroe.  States that she has a 1pm meeting so if you call at that time she will call back
Lm for mom to return call.    May speak with any of the nurses, not sure what she is needing with the labs ,
Davina

## 2024-11-20 NOTE — CHART NOTE - NSCHARTNOTEFT_GEN_A_CORE
Post-Operative Check    Pt evaluated in RR resting c/o moderate lucila-incisional pain. No Chest Pain, SOB, N/V.    Vitals:  T(C): 36.4 (11-20-24 @ 14:20), Max: 36.6 (11-20-24 @ 10:08)  HR: 90 (11-20-24 @ 16:00) (89 - 110)  BP: 137/66 (11-20-24 @ 16:00) (127/71 - 157/91)  RR: 16 (11-20-24 @ 16:00) (16 - 18)  SpO2: 97% (11-20-24 @ 16:00) (92% - 100%)    Exam:  Alert and Oriented, No Acute Distress. VSS.   Laterality: RLE     Aquacel dressing is clean, dry and intact.      (+) PF/DF/EHL/FHL 5/5     Sensation intact to light touch      2+ DP/PT pulse  Calves soft, non-tender bilaterally    Xray: AP/Lateral Right knee  IMPRESSION: Prosthetics appear aligned and symmetrical.  No signs of periprosthetic changes/fractures.  Will review official read when available.     A/P: 74y Female s/p Right Total Knee Arthroplasty with HOME robotic assist. VSS. NAD  -PT/OT: WBAT/OOB  -IS bedside  -Ice/elevation   -DVT PPx: Aspirin 81mg BID, SCD, Early OOB and Amb  -GI PPx: Protonix 40mg   -Pain Control  -Bowel regimen  -Continue post-op abx x 24hrs  -f/u AM labs.   -SAI     -Patient has own CPAP  -Dispo planning: PACU to Floor, pending PT/OT eval.     Celestino Arzola PA-C  Orthopedic Surgery Team  Team Pager #[phone]

## 2024-11-20 NOTE — DISCHARGE NOTE PROVIDER - CARE PROVIDER_API CALL
Luis Ghosh  Joint Reconstruction  [address], [address]  [place] [address]  Phone: [phone]  Fax: [phone]  Follow Up Time:

## 2024-11-21 ENCOUNTER — TRANSCRIPTION ENCOUNTER (OUTPATIENT)
Age: 74
End: 2024-11-21

## 2024-11-21 VITALS — HEART RATE: 69 BPM | DIASTOLIC BLOOD PRESSURE: 84 MMHG | OXYGEN SATURATION: 93 % | SYSTOLIC BLOOD PRESSURE: 143 MMHG

## 2024-11-21 LAB
A1C WITH ESTIMATED AVERAGE GLUCOSE RESULT: 5.4 % — SIGNIFICANT CHANGE UP (ref 4–5.6)
ANION GAP SERPL CALC-SCNC: 11 MMOL/L — SIGNIFICANT CHANGE UP (ref 5–17)
BUN SERPL-MCNC: 12 MG/DL — SIGNIFICANT CHANGE UP (ref 7–23)
CALCIUM SERPL-MCNC: 8.1 MG/DL — LOW (ref 8.4–10.5)
CHLORIDE SERPL-SCNC: 104 MMOL/L — SIGNIFICANT CHANGE UP (ref 96–108)
CO2 SERPL-SCNC: 25 MMOL/L — SIGNIFICANT CHANGE UP (ref 22–31)
CREAT SERPL-MCNC: 0.69 MG/DL — SIGNIFICANT CHANGE UP (ref 0.5–1.3)
EGFR: 90 ML/MIN/1.73M2 — SIGNIFICANT CHANGE UP
ESTIMATED AVERAGE GLUCOSE: 108 MG/DL — SIGNIFICANT CHANGE UP (ref 68–114)
GLUCOSE SERPL-MCNC: 105 MG/DL — HIGH (ref 70–99)
HCT VFR BLD CALC: 33.4 % — LOW (ref 34.5–45)
HGB BLD-MCNC: 11.1 G/DL — LOW (ref 11.5–15.5)
MCHC RBC-ENTMCNC: 29.2 PG — SIGNIFICANT CHANGE UP (ref 27–34)
MCHC RBC-ENTMCNC: 33.2 G/DL — SIGNIFICANT CHANGE UP (ref 32–36)
MCV RBC AUTO: 87.9 FL — SIGNIFICANT CHANGE UP (ref 80–100)
NRBC # BLD: 0 /100 WBCS — SIGNIFICANT CHANGE UP (ref 0–0)
PLATELET # BLD AUTO: 273 K/UL — SIGNIFICANT CHANGE UP (ref 150–400)
POTASSIUM SERPL-MCNC: 3.4 MMOL/L — LOW (ref 3.5–5.3)
POTASSIUM SERPL-SCNC: 3.4 MMOL/L — LOW (ref 3.5–5.3)
RBC # BLD: 3.8 M/UL — SIGNIFICANT CHANGE UP (ref 3.8–5.2)
RBC # FLD: 13.2 % — SIGNIFICANT CHANGE UP (ref 10.3–14.5)
SODIUM SERPL-SCNC: 140 MMOL/L — SIGNIFICANT CHANGE UP (ref 135–145)
WBC # BLD: 7.15 K/UL — SIGNIFICANT CHANGE UP (ref 3.8–10.5)
WBC # FLD AUTO: 7.15 K/UL — SIGNIFICANT CHANGE UP (ref 3.8–10.5)

## 2024-11-21 PROCEDURE — 97116 GAIT TRAINING THERAPY: CPT

## 2024-11-21 PROCEDURE — 97165 OT EVAL LOW COMPLEX 30 MIN: CPT

## 2024-11-21 PROCEDURE — 80048 BASIC METABOLIC PNL TOTAL CA: CPT

## 2024-11-21 PROCEDURE — 97530 THERAPEUTIC ACTIVITIES: CPT

## 2024-11-21 PROCEDURE — 83036 HEMOGLOBIN GLYCOSYLATED A1C: CPT

## 2024-11-21 PROCEDURE — C1776: CPT

## 2024-11-21 PROCEDURE — C1713: CPT

## 2024-11-21 PROCEDURE — 82962 GLUCOSE BLOOD TEST: CPT

## 2024-11-21 PROCEDURE — 97110 THERAPEUTIC EXERCISES: CPT

## 2024-11-21 PROCEDURE — 97161 PT EVAL LOW COMPLEX 20 MIN: CPT

## 2024-11-21 PROCEDURE — 85027 COMPLETE CBC AUTOMATED: CPT

## 2024-11-21 PROCEDURE — 73560 X-RAY EXAM OF KNEE 1 OR 2: CPT

## 2024-11-21 PROCEDURE — S2900: CPT

## 2024-11-21 RX ORDER — OXYCODONE HYDROCHLORIDE 30 MG/1
0.5 TABLET ORAL
Qty: 42 | Refills: 0
Start: 2024-11-21 | End: 2024-11-27

## 2024-11-21 RX ORDER — POLYETHYLENE GLYCOL 3350 17 G/17G
17 POWDER, FOR SOLUTION ORAL
Qty: 0 | Refills: 0 | DISCHARGE
Start: 2024-11-21

## 2024-11-21 RX ORDER — PANTOPRAZOLE SODIUM 40 MG/1
1 TABLET, DELAYED RELEASE ORAL
Qty: 30 | Refills: 0
Start: 2024-11-21 | End: 2024-12-20

## 2024-11-21 RX ORDER — ACETAMINOPHEN 500MG 500 MG/1
2 TABLET, COATED ORAL
Qty: 56 | Refills: 0
Start: 2024-11-21 | End: 2024-11-27

## 2024-11-21 RX ORDER — SENNOSIDES 8.6 MG
2 TABLET ORAL
Qty: 60 | Refills: 0
Start: 2024-11-21 | End: 2024-12-20

## 2024-11-21 RX ORDER — MELOXICAM 7.5 MG
1 TABLET ORAL
Refills: 0 | DISCHARGE

## 2024-11-21 RX ORDER — POTASSIUM CHLORIDE 600 MG/1
20 TABLET, EXTENDED RELEASE ORAL
Refills: 0 | Status: COMPLETED | OUTPATIENT
Start: 2024-11-21 | End: 2024-11-21

## 2024-11-21 RX ORDER — TRAMADOL HYDROCHLORIDE 300 MG/1
1 CAPSULE ORAL
Qty: 28 | Refills: 0
Start: 2024-11-21 | End: 2024-11-27

## 2024-11-21 RX ORDER — NAPROXEN SODIUM 275 MG
1 TABLET ORAL
Qty: 28 | Refills: 0
Start: 2024-11-21 | End: 2024-12-04

## 2024-11-21 RX ORDER — NALOXONE HCL 0.4 MG/ML
1 AMPUL (ML) INJECTION
Qty: 1 | Refills: 0
Start: 2024-11-21

## 2024-11-21 RX ADMIN — Medication 150 MICROGRAM(S): at 05:39

## 2024-11-21 RX ADMIN — PANTOPRAZOLE SODIUM 40 MILLIGRAM(S): 40 TABLET, DELAYED RELEASE ORAL at 05:39

## 2024-11-21 RX ADMIN — ACETAMINOPHEN 500MG 400 MILLIGRAM(S): 500 TABLET, COATED ORAL at 05:39

## 2024-11-21 RX ADMIN — ONDANSETRON HYDROCHLORIDE 4 MILLIGRAM(S): 4 TABLET, FILM COATED ORAL at 10:30

## 2024-11-21 RX ADMIN — DEXAMETHASONE 101.6 MILLIGRAM(S): 1.5 TABLET ORAL at 06:51

## 2024-11-21 RX ADMIN — KETOROLAC TROMETHAMINE 15 MILLIGRAM(S): 30 INJECTION INTRAMUSCULAR; INTRAVENOUS at 06:30

## 2024-11-21 RX ADMIN — POTASSIUM CHLORIDE 20 MILLIEQUIVALENT(S): 600 TABLET, EXTENDED RELEASE ORAL at 11:14

## 2024-11-21 RX ADMIN — KETOROLAC TROMETHAMINE 15 MILLIGRAM(S): 30 INJECTION INTRAMUSCULAR; INTRAVENOUS at 12:25

## 2024-11-21 RX ADMIN — OXYCODONE HYDROCHLORIDE 10 MILLIGRAM(S): 30 TABLET ORAL at 05:10

## 2024-11-21 RX ADMIN — ACETAMINOPHEN 500MG 1000 MILLIGRAM(S): 500 TABLET, COATED ORAL at 06:30

## 2024-11-21 RX ADMIN — ACETAMINOPHEN 500MG 1000 MILLIGRAM(S): 500 TABLET, COATED ORAL at 12:25

## 2024-11-21 RX ADMIN — POTASSIUM CHLORIDE 20 MILLIEQUIVALENT(S): 600 TABLET, EXTENDED RELEASE ORAL at 14:40

## 2024-11-21 RX ADMIN — OXYCODONE HYDROCHLORIDE 10 MILLIGRAM(S): 30 TABLET ORAL at 04:31

## 2024-11-21 RX ADMIN — Medication 81 MILLIGRAM(S): at 05:39

## 2024-11-21 RX ADMIN — SODIUM CHLORIDE 500 MILLILITER(S): 9 INJECTION, SOLUTION INTRAMUSCULAR; INTRAVENOUS; SUBCUTANEOUS at 05:40

## 2024-11-21 RX ADMIN — KETOROLAC TROMETHAMINE 15 MILLIGRAM(S): 30 INJECTION INTRAMUSCULAR; INTRAVENOUS at 11:24

## 2024-11-21 RX ADMIN — Medication 100 MILLIGRAM(S): at 05:39

## 2024-11-21 RX ADMIN — ACETAMINOPHEN 500MG 400 MILLIGRAM(S): 500 TABLET, COATED ORAL at 11:25

## 2024-11-21 RX ADMIN — KETOROLAC TROMETHAMINE 15 MILLIGRAM(S): 30 INJECTION INTRAMUSCULAR; INTRAVENOUS at 00:15

## 2024-11-21 RX ADMIN — KETOROLAC TROMETHAMINE 15 MILLIGRAM(S): 30 INJECTION INTRAMUSCULAR; INTRAVENOUS at 05:38

## 2024-11-21 RX ADMIN — Medication 1 TABLET(S): at 11:13

## 2024-11-21 NOTE — PROGRESS NOTE ADULT - ASSESSMENT
A/P: 74F s/p R TKA    Pain control  WBAT RLE  PT/OT  Dispo planning pending home    Raji Driver MD  Orthopaedic Surgery Resident    For all questions, please reach out via the following numbers for the on-call resident; do not reach out via Teams.  [facility] [phone]  J        [phone]  [facility]  [phone]/ [phone]

## 2024-11-21 NOTE — PROGRESS NOTE ADULT - SUBJECTIVE AND OBJECTIVE BOX
Orthopedic Surgery Progress Note     S: Patient seen and examined today. No acute events overnight. Pain is well controlled. Denies f/c, chest pain, shortness of breath, dizziness.    MEDICATIONS  (STANDING):  acetaminophen     Tablet .. 1000 milliGRAM(s) Oral every 8 hours  acetaminophen   IVPB .. 1000 milliGRAM(s) IV Intermittent once  amLODIPine   Tablet 5 milliGRAM(s) Oral daily  aspirin enteric coated 81 milliGRAM(s) Oral two times a day  hydrochlorothiazide 25 milliGRAM(s) Oral daily  ketorolac   Injectable 15 milliGRAM(s) IV Push every 6 hours  latanoprost 0.005% Ophthalmic Solution 1 Drop(s) Both EYES at bedtime  levothyroxine 150 MICROGram(s) Oral daily  losartan 100 milliGRAM(s) Oral daily  multivitamin 1 Tablet(s) Oral daily  pantoprazole    Tablet 40 milliGRAM(s) Oral before breakfast  polyethylene glycol 3350 17 Gram(s) Oral at bedtime  senna 2 Tablet(s) Oral at bedtime  sucralfate 1 Gram(s) Oral <User Schedule>    MEDICATIONS  (PRN):  magnesium hydroxide Suspension 30 milliLiter(s) Oral daily PRN Constipation  ondansetron Injectable 4 milliGRAM(s) IV Push every 6 hours PRN Nausea and/or Vomiting  oxyCODONE    IR 5 milliGRAM(s) Oral every 4 hours PRN Moderate Pain (4 - 6)  oxyCODONE    IR 10 milliGRAM(s) Oral every 4 hours PRN Severe Pain (7 - 10)  traMADol 50 milliGRAM(s) Oral every 6 hours PRN Mild Pain (1 - 3)      Physical Exam:  Gen: NAD  Right Lower Extremity:  aquacel c/d/i  SILT S/S/DP/SP/T  +EHL/FHL/TA/GSC  Compartments soft/non-tender  Toes warm, Cap refill brisk/warm and perfused, +DP/PT pulse       Vital Signs Last 24 Hrs  T(C): 36.3 (21 Nov 2024 04:08), Max: 36.6 (20 Nov 2024 10:08)  T(F): 97.4 (21 Nov 2024 04:08), Max: 97.9 (20 Nov 2024 10:08)  HR: 70 (21 Nov 2024 04:08) (70 - 110)  BP: 127/80 (21 Nov 2024 04:08) (104/58 - 157/91)  BP(mean): 78 (20 Nov 2024 17:30) (76 - 103)  RR: 18 (21 Nov 2024 04:08) (16 - 18)  SpO2: 97% (21 Nov 2024 04:08) (91% - 100%)    Parameters below as of 21 Nov 2024 04:08  Patient On (Oxygen Delivery Method): room air        11-20-24 @ 07:01  -  11-21-24 @ 06:57  --------------------------------------------------------  IN: 600 mL / OUT: 700 mL / NET: -100 mL                    LABS:

## 2024-11-21 NOTE — OCCUPATIONAL THERAPY INITIAL EVALUATION ADULT - PERTINENT HX OF CURRENT PROBLEM, REHAB EVAL
74 year old female with h/o HTN, SAI (CPAP), hypothyroid, acid reflux, obesity, PCN allergy (angioedema, fainting), osteoarthritis. Now s/p R TKA 11/20/24.

## 2024-11-21 NOTE — OCCUPATIONAL THERAPY INITIAL EVALUATION ADULT - LIVES WITH, PROFILE
Pt lives in a house with her family (daughter, ), 6+6 steps inside, +shower tub, IND PTA without AD. Owns RW and shower chair

## 2024-11-21 NOTE — DISCHARGE NOTE NURSING/CASE MANAGEMENT/SOCIAL WORK - PATIENT PORTAL LINK FT
You can access the FollowMyHealth Patient Portal offered by [facility] by registering at the following website: [email or web address]. By joining [facility]’s FollowMyHealth portal, you will also be able to view your health information using other applications (apps) compatible with our system.

## 2024-11-21 NOTE — OCCUPATIONAL THERAPY INITIAL EVALUATION ADULT - DIAGNOSIS, OT EVAL
Pt presents with pain, decreased ROM, strength, endurance, balance, all impacting ability to perform ADLs and functional mobility.

## 2024-11-21 NOTE — DISCHARGE NOTE NURSING/CASE MANAGEMENT/SOCIAL WORK - FINANCIAL ASSISTANCE
[facility] provides services at a reduced cost to those who are determined to be eligible through [facility]’s financial assistance program. Information regarding [facility]’s financial assistance program can be found by going to [email or web address] or by calling [phone].

## 2024-11-22 ENCOUNTER — NON-APPOINTMENT (OUTPATIENT)
Age: 74
End: 2024-11-22

## 2024-11-29 RX ORDER — TRAMADOL HYDROCHLORIDE 50 MG/1
50 TABLET, COATED ORAL
Qty: 28 | Refills: 0 | Status: ACTIVE | COMMUNITY
Start: 2024-11-29 | End: 1900-01-01

## 2024-11-29 RX ORDER — NAPROXEN 500 MG/1
500 TABLET ORAL
Qty: 60 | Refills: 2 | Status: ACTIVE | COMMUNITY
Start: 2024-11-29 | End: 1900-01-01

## 2024-11-29 RX ORDER — ACETAMINOPHEN 500 MG/1
500 TABLET ORAL
Qty: 60 | Refills: 2 | Status: ACTIVE | COMMUNITY
Start: 2024-11-29 | End: 1900-01-01

## 2024-12-04 ENCOUNTER — APPOINTMENT (OUTPATIENT)
Dept: ORTHOPEDIC SURGERY | Facility: CLINIC | Age: 74
End: 2024-12-04
Payer: MEDICARE

## 2024-12-04 VITALS — WEIGHT: 171 LBS | HEIGHT: 60 IN | BODY MASS INDEX: 33.57 KG/M2

## 2024-12-04 VITALS — WEIGHT: 171 LBS | BODY MASS INDEX: 33.57 KG/M2 | HEIGHT: 60 IN

## 2024-12-04 DIAGNOSIS — Z96.651 PRESENCE OF RIGHT ARTIFICIAL KNEE JOINT: ICD-10-CM

## 2024-12-04 PROCEDURE — 99024 POSTOP FOLLOW-UP VISIT: CPT

## 2024-12-04 PROCEDURE — 73564 X-RAY EXAM KNEE 4 OR MORE: CPT | Mod: 26,RT

## 2024-12-10 NOTE — PRE-ANESTHESIA EVALUATION ADULT - HEIGHT IN CM
Patient's hemorrhage is due to gastrointestinal bleed, patient does not have a propensity for bleeding.. Patients most recent Hgb, Hct, platelets, and INR are listed below.  Recent Labs     12/10/24  1240   HGB 11.5*   HCT 34.5*   *   INR 1.0     Plan  - Will trend hemoglobin/hematocrit Every 12 hours  - Will monitor and correct any coagulation defects  - Will transfuse if Hgb is <7g/dl (<8g/dl in cases of active ACS) or if patient has rapid bleeding leading to hemodynamic instability  - GI consulted, appreciate recommendations   149.6

## 2024-12-12 RX ORDER — OXYCODONE 5 MG/1
5 TABLET ORAL EVERY 4 HOURS
Qty: 42 | Refills: 0 | Status: ACTIVE | COMMUNITY
Start: 2024-12-12 | End: 1900-01-01

## 2025-01-03 ENCOUNTER — APPOINTMENT (OUTPATIENT)
Dept: ORTHOPEDIC SURGERY | Facility: CLINIC | Age: 75
End: 2025-01-03
Payer: MEDICARE

## 2025-01-03 VITALS — WEIGHT: 171 LBS | BODY MASS INDEX: 33.57 KG/M2 | HEIGHT: 60 IN

## 2025-01-03 DIAGNOSIS — Z96.651 PRESENCE OF RIGHT ARTIFICIAL KNEE JOINT: ICD-10-CM

## 2025-01-03 DIAGNOSIS — N39.41 URGE INCONTINENCE: ICD-10-CM

## 2025-01-03 DIAGNOSIS — M17.11 UNILATERAL PRIMARY OSTEOARTHRITIS, RIGHT KNEE: ICD-10-CM

## 2025-01-03 DIAGNOSIS — Z87.898 PERSONAL HISTORY OF OTHER SPECIFIED CONDITIONS: ICD-10-CM

## 2025-01-03 PROCEDURE — 99024 POSTOP FOLLOW-UP VISIT: CPT

## 2025-02-14 ENCOUNTER — APPOINTMENT (OUTPATIENT)
Dept: ENDOCRINOLOGY | Facility: CLINIC | Age: 75
End: 2025-02-14
Payer: MEDICARE

## 2025-02-14 VITALS
HEIGHT: 60 IN | SYSTOLIC BLOOD PRESSURE: 136 MMHG | DIASTOLIC BLOOD PRESSURE: 84 MMHG | BODY MASS INDEX: 33.78 KG/M2 | OXYGEN SATURATION: 99 % | HEART RATE: 94 BPM | WEIGHT: 172.06 LBS

## 2025-02-14 DIAGNOSIS — E78.00 PURE HYPERCHOLESTEROLEMIA, UNSPECIFIED: ICD-10-CM

## 2025-02-14 DIAGNOSIS — E03.9 HYPOTHYROIDISM, UNSPECIFIED: ICD-10-CM

## 2025-02-14 DIAGNOSIS — E66.9 OBESITY, UNSPECIFIED: ICD-10-CM

## 2025-02-14 PROCEDURE — 36415 COLL VENOUS BLD VENIPUNCTURE: CPT

## 2025-02-14 PROCEDURE — G2211 COMPLEX E/M VISIT ADD ON: CPT

## 2025-02-14 PROCEDURE — 99214 OFFICE O/P EST MOD 30 MIN: CPT

## 2025-02-19 ENCOUNTER — NON-APPOINTMENT (OUTPATIENT)
Age: 75
End: 2025-02-19

## 2025-02-19 LAB
25(OH)D3 SERPL-MCNC: 25.4 NG/ML
ALBUMIN SERPL ELPH-MCNC: 4.6 G/DL
ALP BLD-CCNC: 98 U/L
ALT SERPL-CCNC: 21 U/L
ANION GAP SERPL CALC-SCNC: 12 MMOL/L
AST SERPL-CCNC: 22 U/L
BILIRUB SERPL-MCNC: 0.2 MG/DL
BUN SERPL-MCNC: 25 MG/DL
CALCIUM SERPL-MCNC: 9.7 MG/DL
CHLORIDE SERPL-SCNC: 103 MMOL/L
CHOLEST SERPL-MCNC: 220 MG/DL
CO2 SERPL-SCNC: 26 MMOL/L
CREAT SERPL-MCNC: 0.74 MG/DL
EGFR: 84 ML/MIN/1.73M2
ESTIMATED AVERAGE GLUCOSE: 128 MG/DL
GLUCOSE SERPL-MCNC: 93 MG/DL
HBA1C MFR BLD HPLC: 6.1 %
HDLC SERPL-MCNC: 42 MG/DL
LDLC SERPL CALC-MCNC: 141 MG/DL
NONHDLC SERPL-MCNC: 177 MG/DL
POTASSIUM SERPL-SCNC: 4.5 MMOL/L
PROT SERPL-MCNC: 7.1 G/DL
SODIUM SERPL-SCNC: 141 MMOL/L
T4 FREE SERPL-MCNC: 1.4 NG/DL
TRIGL SERPL-MCNC: 202 MG/DL
TSH SERPL-ACNC: 3.24 UIU/ML

## 2025-02-20 RX ORDER — TIRZEPATIDE 2.5 MG/.5ML
2.5 INJECTION, SOLUTION SUBCUTANEOUS
Qty: 1 | Refills: 3 | Status: ACTIVE | COMMUNITY
Start: 2025-02-14

## 2025-03-09 ENCOUNTER — EMERGENCY (EMERGENCY)
Facility: HOSPITAL | Age: 75
LOS: 1 days | Discharge: HOME | End: 2025-03-09
Attending: EMERGENCY MEDICINE | Admitting: EMERGENCY MEDICINE
Payer: MEDICARE

## 2025-03-09 VITALS
OXYGEN SATURATION: 99 % | SYSTOLIC BLOOD PRESSURE: 182 MMHG | RESPIRATION RATE: 18 BRPM | DIASTOLIC BLOOD PRESSURE: 95 MMHG | HEART RATE: 121 BPM | TEMPERATURE: 98 F

## 2025-03-09 DIAGNOSIS — Z98.890 OTHER SPECIFIED POSTPROCEDURAL STATES: Chronic | ICD-10-CM

## 2025-03-09 DIAGNOSIS — Z90.49 ACQUIRED ABSENCE OF OTHER SPECIFIED PARTS OF DIGESTIVE TRACT: Chronic | ICD-10-CM

## 2025-03-09 LAB
ALBUMIN SERPL ELPH-MCNC: 4.5 G/DL — SIGNIFICANT CHANGE UP (ref 3.3–5)
ALP SERPL-CCNC: 78 U/L — SIGNIFICANT CHANGE UP (ref 40–120)
ALT FLD-CCNC: 24 U/L — SIGNIFICANT CHANGE UP (ref 4–33)
ANION GAP SERPL CALC-SCNC: 12 MMOL/L — SIGNIFICANT CHANGE UP (ref 7–14)
APTT BLD: 27.5 SEC — SIGNIFICANT CHANGE UP (ref 24.5–35.6)
AST SERPL-CCNC: 28 U/L — SIGNIFICANT CHANGE UP (ref 4–32)
BASOPHILS # BLD AUTO: 0.06 K/UL — SIGNIFICANT CHANGE UP (ref 0–0.2)
BASOPHILS NFR BLD AUTO: 0.9 % — SIGNIFICANT CHANGE UP (ref 0–2)
BILIRUB SERPL-MCNC: <0.2 MG/DL — SIGNIFICANT CHANGE UP (ref 0.2–1.2)
BLD GP AB SCN SERPL QL: NEGATIVE — SIGNIFICANT CHANGE UP
BLOOD GAS VENOUS COMPREHENSIVE RESULT: SIGNIFICANT CHANGE UP
BUN SERPL-MCNC: 23 MG/DL — SIGNIFICANT CHANGE UP (ref 7–23)
CALCIUM SERPL-MCNC: 9.5 MG/DL — SIGNIFICANT CHANGE UP (ref 8.4–10.5)
CHLORIDE SERPL-SCNC: 104 MMOL/L — SIGNIFICANT CHANGE UP (ref 98–107)
CO2 SERPL-SCNC: 23 MMOL/L — SIGNIFICANT CHANGE UP (ref 22–31)
CREAT SERPL-MCNC: 0.61 MG/DL — SIGNIFICANT CHANGE UP (ref 0.5–1.3)
EGFR: 93 ML/MIN/1.73M2 — SIGNIFICANT CHANGE UP
EOSINOPHIL # BLD AUTO: 0.18 K/UL — SIGNIFICANT CHANGE UP (ref 0–0.5)
EOSINOPHIL NFR BLD AUTO: 2.8 % — SIGNIFICANT CHANGE UP (ref 0–6)
GLUCOSE SERPL-MCNC: 85 MG/DL — SIGNIFICANT CHANGE UP (ref 70–99)
HCT VFR BLD CALC: 36.2 % — SIGNIFICANT CHANGE UP (ref 34.5–45)
HGB BLD-MCNC: 12.3 G/DL — SIGNIFICANT CHANGE UP (ref 11.5–15.5)
IANC: 4.32 K/UL — SIGNIFICANT CHANGE UP (ref 1.8–7.4)
IMM GRANULOCYTES NFR BLD AUTO: 0.3 % — SIGNIFICANT CHANGE UP (ref 0–0.9)
INR BLD: 0.99 RATIO — SIGNIFICANT CHANGE UP (ref 0.85–1.16)
LYMPHOCYTES # BLD AUTO: 1.36 K/UL — SIGNIFICANT CHANGE UP (ref 1–3.3)
LYMPHOCYTES # BLD AUTO: 21.3 % — SIGNIFICANT CHANGE UP (ref 13–44)
MCHC RBC-ENTMCNC: 29.2 PG — SIGNIFICANT CHANGE UP (ref 27–34)
MCHC RBC-ENTMCNC: 34 G/DL — SIGNIFICANT CHANGE UP (ref 32–36)
MCV RBC AUTO: 86 FL — SIGNIFICANT CHANGE UP (ref 80–100)
MONOCYTES # BLD AUTO: 0.46 K/UL — SIGNIFICANT CHANGE UP (ref 0–0.9)
MONOCYTES NFR BLD AUTO: 7.2 % — SIGNIFICANT CHANGE UP (ref 2–14)
NEUTROPHILS # BLD AUTO: 4.32 K/UL — SIGNIFICANT CHANGE UP (ref 1.8–7.4)
NEUTROPHILS NFR BLD AUTO: 67.5 % — SIGNIFICANT CHANGE UP (ref 43–77)
NRBC # BLD AUTO: 0 K/UL — SIGNIFICANT CHANGE UP (ref 0–0)
NRBC # FLD: 0 K/UL — SIGNIFICANT CHANGE UP (ref 0–0)
NRBC BLD AUTO-RTO: 0 /100 WBCS — SIGNIFICANT CHANGE UP (ref 0–0)
NT-PROBNP SERPL-SCNC: 246 PG/ML — SIGNIFICANT CHANGE UP
PLATELET # BLD AUTO: 290 K/UL — SIGNIFICANT CHANGE UP (ref 150–400)
POTASSIUM SERPL-MCNC: 4.1 MMOL/L — SIGNIFICANT CHANGE UP (ref 3.5–5.3)
POTASSIUM SERPL-SCNC: 4.1 MMOL/L — SIGNIFICANT CHANGE UP (ref 3.5–5.3)
PROT SERPL-MCNC: 7.4 G/DL — SIGNIFICANT CHANGE UP (ref 6–8.3)
PROTHROM AB SERPL-ACNC: 11.8 SEC — SIGNIFICANT CHANGE UP (ref 9.9–13.4)
RBC # BLD: 4.21 M/UL — SIGNIFICANT CHANGE UP (ref 3.8–5.2)
RBC # FLD: 13.2 % — SIGNIFICANT CHANGE UP (ref 10.3–14.5)
RH IG SCN BLD-IMP: POSITIVE — SIGNIFICANT CHANGE UP
SODIUM SERPL-SCNC: 139 MMOL/L — SIGNIFICANT CHANGE UP (ref 135–145)
TROPONIN T, HIGH SENSITIVITY RESULT: 17 NG/L — SIGNIFICANT CHANGE UP
WBC # BLD: 6.4 K/UL — SIGNIFICANT CHANGE UP (ref 3.8–10.5)
WBC # FLD AUTO: 6.4 K/UL — SIGNIFICANT CHANGE UP (ref 3.8–10.5)

## 2025-03-09 PROCEDURE — 99285 EMERGENCY DEPT VISIT HI MDM: CPT

## 2025-03-09 PROCEDURE — 93970 EXTREMITY STUDY: CPT | Mod: 26

## 2025-03-09 PROCEDURE — 71045 X-RAY EXAM CHEST 1 VIEW: CPT | Mod: 26

## 2025-03-09 PROCEDURE — 71275 CT ANGIOGRAPHY CHEST: CPT | Mod: 26

## 2025-03-09 NOTE — ED PROVIDER NOTE - PHYSICAL EXAMINATION
GEN: Patient awake and alert. No acute distress, non-toxic.  Head: normocephalic, atraumatic.  CARDIAC: RRR. LE swelling  PULM: CTA B/L no wheeze, rales or rhonchi. No signs of respiratory distress, no accessory muscle usage or nasal flaring.  ABD: Soft, nontender, nondistended. No rebound, no involuntary guarding.   : No CVA tenderness, no suprapubic tenderness.  MSK: Moving all extremities spontaneously.   NEURO: A&Ox3.  SKIN: Warm, dry

## 2025-03-09 NOTE — ED PROVIDER NOTE - ATTENDING CONTRIBUTION TO CARE
The patient is a 74y Female who has a past medical and surgery history of Hypothyroid HTN Osteoarthritis GERD SAI on CPAP cholecystectomy ovarian cystectomy Endoscopy Colonoscopy PTED c/o SOB x 2 weeks. + D-dimer results on Thursday. Had surgery to right knee in November. Swelling to right LE since then. Denies chest pain palpitations or dizziness.    Vital Signs Last 24 Hrs  T(F): 98.3 HR: 121 BP: 182/95 RR: 18 SpO2: 99% (09 Mar 2025 15:02)   PE: as described; my additions and exceptions are noted in the chart    DATA:  EKG: pending at time of evaluation  LAB: Pending at time of evaluation    IMPRESSION/RISK:  Dx= Leg swelling   Consideration include above + prior surgery an d relative immobility c.w baseline s/p op knee sx prompted ddimer that was + as outpt and patient being sent in for CTPE protocol;  Will reassess; ordered duplexes in case CTPE negative and further w/u of leg swelling needed  Will reassess   Plan

## 2025-03-09 NOTE — ED PROVIDER NOTE - PATIENT PORTAL LINK FT
You can access the FollowMyHealth Patient Portal offered by [facility] by registering at the following website: [email or web address]. By joining [facility]’s FollowMyHealth portal, you will also be able to view your health information using other applications (apps) compatible with our system.

## 2025-03-09 NOTE — ED PROVIDER NOTE - PROGRESS NOTE DETAILS
Laboratory work grossly nonactionable.  CT angio of the chest negative for pulmonary embolism however does show a left renal hypodense focus.  Will inform patient and encourage follow-up with her PCP and nephrology outpatient.  Negative DVT study.

## 2025-03-09 NOTE — ED PROVIDER NOTE - NSFOLLOWUPCLINICS_GEN_ALL_ED_FT
[facility]  Nephrology  [address] [place] [address], [address] [place] [address]  Phone: [phone]  Fax:   Follow Up Time: 1-3 Days

## 2025-03-09 NOTE — ED PROVIDER NOTE - NSFOLLOWUPINSTRUCTIONS_ED_ALL_ED_FT
You were sent in for an elevated D-dimer and lower extremity edema.  We obtained a CT angio study to evaluate for pulmonary embolism.  This test was negative for pulmonary embolism however did show  left renal hypodense focus.     Please follow-up with your primary care provider for further workup and management of this finding.  I am also listing the contact of admission for nephrologist for the left renal hypodense focus found on CT imaging.    YOU SHOULD ALSO GET A REPEAT ULTRASOUND OF YOUR LEGS TO ENSURE YOU DO NOT HAVE A LOWER EXTREMITY DVT IN 1 WEEK. This can be arranged with your primary care provider.    If you develop fevers, chills, chest pain, shortness of breath, lightheadedness or dizziness or for any other questions or concerns please return to the emergency department.

## 2025-03-09 NOTE — ED ADULT TRIAGE NOTE - CHIEF COMPLAINT QUOTE
Pt. c/o SOB x 2 weeks. + D-dimer results on Thursday. Had surgery to right knee in November. Swelling to right LE since then. Denies chest pain or dizziness. hx of HTN

## 2025-03-09 NOTE — ED ADULT NURSE NOTE - OBJECTIVE STATEMENT
Pt. presents to rm 8 sent in by pulmonologist for outpatient labs significant for elevated d dimer. Pt. had right knee replacement in 11/24 and since has been having SOB and RLE edema and pain. Pulm sent in for blood work and was planned to have RLE US but never had done outpatient. D dimer sig elevated on outpatient labs so sent to ER for US/CTA. [id number] labs sent medicated order. PMHx SAI HTN and hypothyroid.

## 2025-03-09 NOTE — ED PROVIDER NOTE - CARE PLAN
Principal Discharge DX:	Left arm pain  Secondary Diagnosis:	Lower back pain   1 Principal Discharge DX:	Swelling of lower extremity

## 2025-03-09 NOTE — ED PROVIDER NOTE - OBJECTIVE STATEMENT
59-year-old [place] speaking  female PMHx significant for T2DM currently on Ozempic, HTN, hypothyroidism, thrombocytopenia, presenting to the emergency department for left arm and lower back pain onset 8 days prior.  Patient states while walking down flight of stairs she tripped with impact to the left side of her body sustaining impact directly to the left arm.  Patient denies head strike or LOC.  Since then she has had increased pain to the lower back however has continued to be ambulatory.  Does endorse pain when walking however.  Since the incident she has been unable to lift her arm and has seen significant ecchymosis over the entire left arm.  Given continued pain, inability to lift the arm, and bruising of the arm she presents to the ED to be evaluated.  Otherwise patient denies HA, changes in vision, CP, SOB, ABD pain, urinary incontinence, numbness or tingling over the lower extremities or upper extremities. 74 y.o. F PMHx significant for R. total knee arthroplasty, HTN, hypothyroid, GERD, OA 74 y.o. F PMHx significant for R. total knee arthroplasty, HTN, hypothyroid, GERD, OA, sent in for r/o PE after elevated dimer of 200 outpatient. Patient has had 2 weeks of lower extremity swelling and shortness of breath.  No other reported Sx

## 2025-03-09 NOTE — ED PROVIDER NOTE - CLINICAL SUMMARY MEDICAL DECISION MAKING FREE TEXT BOX
59-year-old [place] speaking female PMHx significant for T2DM currently on Ozempic, HTN, hypothyroidism, thrombocytopenia, presenting to the emergency department for left arm and lower back pain onset 8 days prior. 74 y.o. F PMHx significant for R. total knee arthroplasty, HTN, hypothyroid, GERD, OA, sent in for r/o PE after elevated dimer of 200 outpatient. Given HPI and reported elevated d-dimer, lower extremity edema and CT angio chest to rule out PE. Will obtain screening labs. further steps pending initial work up

## 2025-03-18 ENCOUNTER — APPOINTMENT (OUTPATIENT)
Dept: ULTRASOUND IMAGING | Facility: CLINIC | Age: 75
End: 2025-03-18
Payer: MEDICARE

## 2025-03-18 PROCEDURE — 76775 US EXAM ABDO BACK WALL LIM: CPT

## 2025-03-19 ENCOUNTER — APPOINTMENT (OUTPATIENT)
Dept: ULTRASOUND IMAGING | Facility: CLINIC | Age: 75
End: 2025-03-19
Payer: MEDICARE

## 2025-03-19 PROCEDURE — 93970 EXTREMITY STUDY: CPT

## 2025-05-06 ENCOUNTER — APPOINTMENT (OUTPATIENT)
Dept: ORTHOPEDIC SURGERY | Facility: CLINIC | Age: 75
End: 2025-05-06
Payer: MEDICARE

## 2025-05-06 VITALS — BODY MASS INDEX: 34.55 KG/M2 | HEIGHT: 60 IN | WEIGHT: 176 LBS

## 2025-05-06 DIAGNOSIS — Z96.651 PRESENCE OF RIGHT ARTIFICIAL KNEE JOINT: ICD-10-CM

## 2025-05-06 PROCEDURE — 99213 OFFICE O/P EST LOW 20 MIN: CPT

## 2025-05-06 PROCEDURE — G2211 COMPLEX E/M VISIT ADD ON: CPT

## 2025-05-06 PROCEDURE — 73564 X-RAY EXAM KNEE 4 OR MORE: CPT | Mod: RT

## 2025-05-30 ENCOUNTER — APPOINTMENT (OUTPATIENT)
Dept: ENDOCRINOLOGY | Facility: CLINIC | Age: 75
End: 2025-05-30
Payer: MEDICARE

## 2025-05-30 VITALS
HEIGHT: 60 IN | OXYGEN SATURATION: 97 % | WEIGHT: 174.56 LBS | BODY MASS INDEX: 34.27 KG/M2 | HEART RATE: 73 BPM | RESPIRATION RATE: 16 BRPM | DIASTOLIC BLOOD PRESSURE: 80 MMHG | SYSTOLIC BLOOD PRESSURE: 145 MMHG | TEMPERATURE: 97.8 F

## 2025-05-30 DIAGNOSIS — G47.33 OBSTRUCTIVE SLEEP APNEA (ADULT) (PEDIATRIC): ICD-10-CM

## 2025-05-30 DIAGNOSIS — R73.03 PREDIABETES.: ICD-10-CM

## 2025-05-30 DIAGNOSIS — E66.9 OBESITY, UNSPECIFIED: ICD-10-CM

## 2025-05-30 DIAGNOSIS — E03.9 HYPOTHYROIDISM, UNSPECIFIED: ICD-10-CM

## 2025-05-30 LAB — HBA1C MFR BLD HPLC: 5.4

## 2025-05-30 PROCEDURE — 83036 HEMOGLOBIN GLYCOSYLATED A1C: CPT | Mod: QW

## 2025-05-30 PROCEDURE — 99214 OFFICE O/P EST MOD 30 MIN: CPT

## 2025-05-30 PROCEDURE — G2211 COMPLEX E/M VISIT ADD ON: CPT

## 2025-06-18 ENCOUNTER — APPOINTMENT (OUTPATIENT)
Dept: MAMMOGRAPHY | Facility: CLINIC | Age: 75
End: 2025-06-18
Payer: MEDICARE

## 2025-06-18 PROCEDURE — 77067 SCR MAMMO BI INCL CAD: CPT

## 2025-06-18 PROCEDURE — 77063 BREAST TOMOSYNTHESIS BI: CPT

## 2025-07-26 ENCOUNTER — RX RENEWAL (OUTPATIENT)
Age: 75
End: 2025-07-26

## 2025-07-26 RX ORDER — LEVOTHYROXINE SODIUM 150 UG/1
150 TABLET ORAL DAILY
Qty: 90 | Refills: 3 | Status: ACTIVE | COMMUNITY
Start: 2025-07-26 | End: 1900-01-01

## 2025-09-05 ENCOUNTER — APPOINTMENT (OUTPATIENT)
Dept: ENDOCRINOLOGY | Facility: CLINIC | Age: 75
End: 2025-09-05
Payer: MEDICARE

## 2025-09-05 VITALS
WEIGHT: 172.31 LBS | BODY MASS INDEX: 33.83 KG/M2 | HEART RATE: 72 BPM | RESPIRATION RATE: 15 BRPM | TEMPERATURE: 98.1 F | OXYGEN SATURATION: 96 % | HEIGHT: 60 IN | SYSTOLIC BLOOD PRESSURE: 140 MMHG | DIASTOLIC BLOOD PRESSURE: 73 MMHG

## 2025-09-05 DIAGNOSIS — E03.9 HYPOTHYROIDISM, UNSPECIFIED: ICD-10-CM

## 2025-09-05 DIAGNOSIS — E66.9 OBESITY, UNSPECIFIED: ICD-10-CM

## 2025-09-05 DIAGNOSIS — G47.33 OBSTRUCTIVE SLEEP APNEA (ADULT) (PEDIATRIC): ICD-10-CM

## 2025-09-05 DIAGNOSIS — E78.00 PURE HYPERCHOLESTEROLEMIA, UNSPECIFIED: ICD-10-CM

## 2025-09-05 PROCEDURE — 99214 OFFICE O/P EST MOD 30 MIN: CPT

## 2025-09-05 PROCEDURE — 36415 COLL VENOUS BLD VENIPUNCTURE: CPT

## 2025-09-05 PROCEDURE — G2211 COMPLEX E/M VISIT ADD ON: CPT

## 2025-09-08 LAB
25(OH)D3 SERPL-MCNC: 24.4 NG/ML
ALBUMIN SERPL ELPH-MCNC: 4.6 G/DL
ALP BLD-CCNC: 84 U/L
ALT SERPL-CCNC: 30 U/L
ANION GAP SERPL CALC-SCNC: 15 MMOL/L
AST SERPL-CCNC: 31 U/L
BILIRUB SERPL-MCNC: 0.3 MG/DL
BUN SERPL-MCNC: 21 MG/DL
CALCIUM SERPL-MCNC: 9.9 MG/DL
CHLORIDE SERPL-SCNC: 101 MMOL/L
CHOLEST SERPL-MCNC: 213 MG/DL
CO2 SERPL-SCNC: 25 MMOL/L
CREAT SERPL-MCNC: 0.74 MG/DL
EGFRCR SERPLBLD CKD-EPI 2021: 84 ML/MIN/1.73M2
ESTIMATED AVERAGE GLUCOSE: 120 MG/DL
GLUCOSE SERPL-MCNC: 99 MG/DL
HBA1C MFR BLD HPLC: 5.8 %
HDLC SERPL-MCNC: 50 MG/DL
LDLC SERPL-MCNC: 138 MG/DL
NONHDLC SERPL-MCNC: 163 MG/DL
POTASSIUM SERPL-SCNC: 4.9 MMOL/L
PROT SERPL-MCNC: 7.2 G/DL
SODIUM SERPL-SCNC: 140 MMOL/L
T4 FREE SERPL-MCNC: 1.4 NG/DL
TRIGL SERPL-MCNC: 136 MG/DL
TSH SERPL-ACNC: 3.25 UIU/ML

## 2025-09-11 RX ORDER — ROSUVASTATIN CALCIUM 5 MG/1
5 TABLET, FILM COATED ORAL
Qty: 90 | Refills: 2 | Status: ACTIVE | COMMUNITY
Start: 2025-09-08 | End: 1900-01-01

## (undated) DEVICE — PACK MIS KNEE (1 PIECE)

## (undated) DEVICE — SUT STRATAFIX SYMMETRIC PDS PLUS 1 18" CTX VIOLET

## (undated) DEVICE — SUT VICRYL 1 27" CPX UNDYED

## (undated) DEVICE — SAW BLADE STRYKER SAGITTAL DUAL CUT 64X35X.89MM

## (undated) DEVICE — MAKO DRAPE KIT

## (undated) DEVICE — SOL IRR POUR NS 0.9% 500ML

## (undated) DEVICE — WARMING BLANKET UPPER ADULT

## (undated) DEVICE — STRYKER INTERPULSE HANDPIECE W IRR SUCTION TUBE

## (undated) DEVICE — DRSG AQUACEL 3.5 X 12"

## (undated) DEVICE — SAW BLADE STRYKER SAGITTAL EXTRA WIDE THIN SHORT

## (undated) DEVICE — ELCTR BOVIE PENCIL SMOKE EVACUATION

## (undated) DEVICE — DRAPE SURGICAL #1010

## (undated) DEVICE — SUT QUILL MONODERM 2-0 3/8 CIRCLE 45CM

## (undated) DEVICE — SUT MONOCRYL 2-0 27" SH UNDYED

## (undated) DEVICE — SUT PDO 2 1/2 CIRCLE 40MM NDL 45CM

## (undated) DEVICE — VENODYNE/SCD SLEEVE CALF MEDIUM

## (undated) DEVICE — BAG DECANTER IV STERILE

## (undated) DEVICE — SOL BETADINE POUCH 0.75OZ STERILE

## (undated) DEVICE — MAKO VIZADISC KNEE TRACKING KIT

## (undated) DEVICE — SUT QUILL MONODERM 0 1/2 CIRCLE TAPR 45CM 26MM

## (undated) DEVICE — MAKO CHECKPOINT KIT FEMORAL / TIBIAL

## (undated) DEVICE — HOOD FLYTE STRYKER SURGICOOL W PEELAWAY

## (undated) DEVICE — GOWN SURGEON VEST

## (undated) DEVICE — HOOD T7 PLUS PEELAWAY

## (undated) DEVICE — TOURNIQUET CUFF 34" DUAL PORT W PLC

## (undated) DEVICE — SOL INJ NS 0.9% 500ML 1-PORT

## (undated) DEVICE — MAKO BLADE STANDARD

## (undated) DEVICE — SOL IRR POUR H2O 250ML

## (undated) DEVICE — GLV 8.5 PROTEXIS (BLUE)

## (undated) DEVICE — GLV 8.5 PROTEXIS (WHITE)

## (undated) DEVICE — DRSG DERMABOND 0.7ML

## (undated) DEVICE — SUT QUILL PDO 1 45CM CTX 48MM

## (undated) DEVICE — VENODYNE/SCD SLEEVE CALF LARGE

## (undated) DEVICE — HOOD T7 NON-PEELAWAY

## (undated) DEVICE — TUBING TUR 2 PRONG

## (undated) DEVICE — GLV 7.5 PROTEXIS (WHITE)

## (undated) DEVICE — POSITIONER FOAM HEADREST (PINK)

## (undated) DEVICE — HOOD FLYTE STRYKER HELMET SHIELD

## (undated) DEVICE — SOL INJ NS 0.9% 1000ML

## (undated) DEVICE — POSITIONER FOAM EGG CRATE ULNAR 2PCS (PINK)

## (undated) DEVICE — MAKO BLADE NARROW

## (undated) DEVICE — POSITIONER CARDIAC BUMP

## (undated) DEVICE — GLV 8 PROTEXIS (WHITE)